# Patient Record
Sex: FEMALE | Race: WHITE | NOT HISPANIC OR LATINO | Employment: FULL TIME | URBAN - METROPOLITAN AREA
[De-identification: names, ages, dates, MRNs, and addresses within clinical notes are randomized per-mention and may not be internally consistent; named-entity substitution may affect disease eponyms.]

---

## 2024-02-05 ENCOUNTER — PRE VISIT (OUTPATIENT)
Dept: ONCOLOGY | Facility: CLINIC | Age: 49
End: 2024-02-05
Payer: COMMERCIAL

## 2024-02-05 ENCOUNTER — PATIENT OUTREACH (OUTPATIENT)
Dept: ONCOLOGY | Facility: CLINIC | Age: 49
End: 2024-02-05
Payer: COMMERCIAL

## 2024-02-05 ENCOUNTER — TRANSCRIBE ORDERS (OUTPATIENT)
Dept: OTHER | Age: 49
End: 2024-02-05

## 2024-02-05 DIAGNOSIS — C57.4 PRIMARY SEROUS CARCINOMA OF UTERINE ADNEXA (H): Primary | ICD-10-CM

## 2024-02-05 DIAGNOSIS — C56.9 OVARIAN CANCER, UNSPECIFIED LATERALITY (H): ICD-10-CM

## 2024-02-05 NOTE — PROGRESS NOTES
"New Patient Hematology / Oncology Nurse Navigator Note     Referral Date: 2/5/24    Referring provider: Patient called. Referred by her team at   Cathy Huston MD   [address] [place] [address]   [phone] (Work)   [phone] (Fax)     Referring Clinic/Organization: [facility]   Self Referred     Referred to: GynOnc    Requested provider (if applicable): Per referral notes, Milla Beckman, Ml or Elayne were recommended by pt's team at     Evaluation for : \"ovarian cancer\" per pt report. Path not yet viewable in CE     Clinical History (per Nurse review of records provided):    1/24/24 Op Note: Total laparoscopic hysterectomy, bilateral salpingectomy, cystoscopy  -- BOOKMARKED  1/31/24 Office Visit with OBGYN at -- BOOKMARKED  2/2  (11) -- BOOKMARKED   10/3/23 Pelvic US--BOOKMARKED  8/14/23 PAP/HPV--BOOKMARKED    Clinical Assessment / Barriers to Care (Per Nurse):  Pt lives in [place], [place]      Records Location: Care Everywhere     Records Needed:   Need 1/24 path report from [facility], records from [facility]/ per protocol     Additional testing needed prior to consult:   N/A    Referral updates and Plan:   Introduced my role as nurse navigator with [facility] and that we have recd the self-referral to GynOnc, but are still awaiting complete records (need path report confirming dx).  Explained to pt that he/she will receive a call from our scheduling intake team and the records team will work to obtain the records.     Provided my direct contact number if any further questions in the meantime.     Nallely Rodrigues, BSN, RN, PHN, OCN  Hematology/Oncology Nurse Navigator  [facility]  [phone]    "

## 2024-02-05 NOTE — TELEPHONE ENCOUNTER
"Action Taken  Date/Description  TJ     WT from NPS February 5, 2024 9:01 AM    Call from Pt to schedule for Dx of Ovarian cancer, unspecified laterality (H) [C56.9]   Test result notes state Pt referred by Dr Cathy Huston   Pt Dx'd @ [facility]  Records updated in Care Everywhere?  Yes - [facility] and [facility]  Prior history of Hematologist, Oncologist? No  Genetic Testing conducted?  No - Referral and consult scheduled for 4.25.24 @ [facility]  Surgical procedures, biopsies?  1.24.24 @ [facility]-  Case: [id number]   Report in CE  Imaging done @: [employer] - 2 Pelvic US's   Has Pt been anywhere else for this condition/concern?  No  Any scheduled follow up's/imaging/surgical procedures/biopsies?  Post Op 3.19.24    Pt had the US's last year followed the Hysterectomy and the cancer was found through the surgical path at that time.    February 29, 2024  IB from provider that path was never received.  Checked tracking and it never shipped.  Re-faxed as 2nd request     Records Requested  TJ   Clinic name Comments/Action Taken   [facility] February 5, 2024 9:33 AM    Called and requested US's be pushed to PACS  10:14 AM    Images from [facility] received and resolved to PACS.     NOTES STATUS DETAILS   OFFICE NOTE from referring provider  Self Referred   LABS     PATHOLOGY REPORTS Requested   TRK: [id number] [facility] Case: [id number]    IMAGING (NEED IMAGES & REPORT)     ULTRASOUND Resolved in PACS [facility]     "

## 2024-02-06 NOTE — PROGRESS NOTES
Addendum 2/6:   Path now viewable in CE showing:  Final Diagnosis A) UTERUS WITH CERVIX AND FALLOPIAN TUBES, TOTAL HYSTERECTOMY WITH BILATERAL SALPINGECTOMY:  1. 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 Left fallopian tube: Benign paratubal cysts, otherwise unremarkable  3. Cervix: Benign endocervical polyp  4. Endometrium: Benign endometrial polyp, background proliferative endometrium  5. Myometrium: Leiomyomas and adenomyosis  6. Uterine serosa: Endometriosis  7. Uterine weight: 237 grams     Scheduling instructions updated and routed to NPS requesting consult with GynOnc within 1-2 weeks pending patient provider/location preference. Will follow-up as needed.     Nallely Rodrigues, DEQUANN, RN, PHN, OCN  Hematology/Oncology Nurse Navigator  [facility]  [phone]

## 2024-02-11 NOTE — PROGRESS NOTES
Consult Notes on Referred Patient    Date: 2024       Dr. Monique Miller MD  No address on file       RE: Jonelle Hart  : 1975  MIKAYLA: 2024    Dear Dr. Referred Self:    I had the pleasure of seeing your patient Jonelle Hart here at the Gynecologic Cancer Clinic at the [facility] on 2024.  As you know she is a very pleasant 48 year old woman with a recent diagnosis of  STIC arising Right FT fimbria.  Given these findings she was subsequently sent to the Gynecologic Cancer Clinic for new patient consultation.     HPI    47 y.o.  presents for above. Jonelle has been previously seen by other providers in [place] and has felt her symptoms have been dismissed. She reports periods are irregular q22-40 days lasting 6-14 days with first few days heavy with clots and very intense cramping unable to function. Under went surgical removal of uterus.    24:   A) UTERUS WITH CERVIX AND FALLOPIAN TUBES, TOTAL HYSTERECTOMY WITH BILATERAL SALPINGECTOMY:   1. 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 Left fallopian tube: Benign paratubal cysts, otherwise unremarkable   3. Cervix: Benign endocervical polyp   4. Endometrium: Benign endometrial polyp, background proliferative endometrium   5. Myometrium: Leiomyomas and adenomyosis   6. Uterine serosa: Endometriosis   7. Uterine weight: 237 grams    Ca 125=11      Review of Systems:    I have reviewed the pertinent positive findings in the review of symptoms with the patient.        Past Medical History:    No heart dz. Lung or DM      Past Surgical History:  Laoparoscopic hyst         Health Maintenance:  Health Maintenance Due   Topic Date Due    ADVANCE CARE PLANNING  Never done    GLUCOSE  Never done    HEPATITIS B IMMUNIZATION (1 of 3 - 3-dose series) Never done    COLORECTAL CANCER SCREENING  Never done    HIV SCREENING  Never done     "HEPATITIS C SCREENING  Never done    PAP  Never done    DTAP/TDAP/TD IMMUNIZATION (1 - Tdap) Never done    LIPID  Never done    YEARLY PREVENTIVE VISIT  2023    PHQ-2 (once per calendar year)  Never done       Last Pap Smear: 2023              Result: normal  She has not had a history of abnormal Pap smears.    Last Mammogram: 23             Result: normal      She has not had a history of abnormal mammograms.    Last Colonoscopy: 1 year ago, 2 polyps removed              Result: abnormal: polyps, next due in 5 years      Current Medications:     has a current medication list which includes the following prescription(s): acetaminophen, ibuprofen, and simethicone.       Allergies:     erythromycin       Social History:     Social History     Tobacco Use    Smoking status: Never    Smokeless tobacco: Never   Substance Use Topics    Alcohol use: Never       History   Drug Use Not on file     No tobacco x 10 years      Family History:     The patient's family history is notable for the following.    Paternal GF  colon cancer in his 50's and paternal cousin age 53, abnormal polyps, no uterine cancer or ovarian cancer  No breast cancer.       Physical Exam:     /77   Pulse 70   Temp 98.1  F (36.7  C)   Resp 15   Ht 1.626 m (5' 4\")   Wt 85.7 kg (189 lb)   SpO2 99%   BMI 32.44 kg/m    Body mass index is 32.44 kg/m .    General Appearance: healthy and alert, no distress      Musculoskeletal: extremities non tender and without edema        Neurological: normal gait, no gross defects     Psychiatric: appropriate mood and affect                               Hematological: normal cervical, supraclavicular and inguinal lymph nodes     Gastrointestinal:       abdomen soft, non-tender, non-distended, no organomegaly or masses    Genitourinary: External genitalia and urethral meatus appears normal.  Vagina is smooth without nodularity or masses.  Cervix appears normal and without lesions.  Bimanual " exam reveal no masses, nodularity or fullness.  Recto-vaginal exam confirms these findings.      Assessment:    Jonelle Hart is a 48 year old woman with a new diagnosis of STIC lesion on FT         Plan:     1.)    STIC lesions are precursor lesions for high-grade serous carcinoma but have the potential for metastatic spread. There is no clear evidence that surgical staging and/or adjuvant therapy is beneficial in patients with STIC lesions alone. Referral to a genetic counselor is recommended because these patients may have a 10 percent chance of carrying a BRCA1 or BRCA2 mutation.  Lengthy discussion held today re lack of true recommendations regarding next steps in this scenario. Recommend GT first and then plan for possible oophorectomy followed by ERT. Patient and  understanding of this and the situation. Will think about next plans however will consent to laparoscopic washings and biopsies at this point.   -Recommend q 6 mos  and pelvic exams.  -Path reread of STIC lesion  -GC appt 4/25/24 earliest appt at .   -Wait 6 weeks for laparoscopic omentectomy, washings, peritoneal biopsies, possible oophorectomy.       2.) Genetic risk factors were assessed and the patient doest meet the qualifications for a referral.  GC referral. Sent note to Peg Beltran re need to get patient in sooner than later.     3.) Labs and/or tests ordered include:  none today.     4.) Health maintenance issues addressed today include UTD.    5.) Pre-op teaching was completed today.  Risks of surgery were discussed to include: bleeding, transfusion, infection, unintentional injury to surrounding organs/structures.      Thank you for allowing us to participate in the care of your patient.         Sincerely,    Mimi Loyola MD  Professor  Department of Ob/Gyn and Women's Health  Division of Gynecologic Oncology  [facility]  [phone]  CC  Patient Care Team:  Maribeth Yi NP as  PCP - General  SELF, REFERRED

## 2024-02-12 ENCOUNTER — PATIENT OUTREACH (OUTPATIENT)
Dept: ONCOLOGY | Facility: CLINIC | Age: 49
End: 2024-02-12

## 2024-02-12 ENCOUNTER — ONCOLOGY VISIT (OUTPATIENT)
Dept: ONCOLOGY | Facility: CLINIC | Age: 49
End: 2024-02-12
Attending: OBSTETRICS & GYNECOLOGY
Payer: COMMERCIAL

## 2024-02-12 ENCOUNTER — PATIENT OUTREACH (OUTPATIENT)
Dept: ONCOLOGY | Facility: CLINIC | Age: 49
End: 2024-02-12
Payer: COMMERCIAL

## 2024-02-12 VITALS
HEART RATE: 70 BPM | TEMPERATURE: 98.1 F | WEIGHT: 189 LBS | SYSTOLIC BLOOD PRESSURE: 116 MMHG | OXYGEN SATURATION: 99 % | DIASTOLIC BLOOD PRESSURE: 77 MMHG | BODY MASS INDEX: 32.27 KG/M2 | RESPIRATION RATE: 15 BRPM | HEIGHT: 64 IN

## 2024-02-12 DIAGNOSIS — C57.01 MALIGNANT NEOPLASM OF FALLOPIAN TUBE, RIGHT (H): Primary | ICD-10-CM

## 2024-02-12 PROCEDURE — 99213 OFFICE O/P EST LOW 20 MIN: CPT | Performed by: OBSTETRICS & GYNECOLOGY

## 2024-02-12 PROCEDURE — 99203 OFFICE O/P NEW LOW 30 MIN: CPT | Performed by: OBSTETRICS & GYNECOLOGY

## 2024-02-12 RX ORDER — SIMETHICONE 80 MG
80 TABLET,CHEWABLE ORAL EVERY 6 HOURS PRN
COMMUNITY
Start: 2024-01-24 | End: 2024-02-18

## 2024-02-12 RX ORDER — HEPARIN SODIUM 5000 [USP'U]/.5ML
5000 INJECTION, SOLUTION INTRAVENOUS; SUBCUTANEOUS
Status: CANCELLED | OUTPATIENT
Start: 2024-02-12

## 2024-02-12 RX ORDER — IBUPROFEN 200 MG
600 TABLET ORAL EVERY 4 HOURS PRN
COMMUNITY
Start: 2024-01-03

## 2024-02-12 RX ORDER — ACETAMINOPHEN 500 MG
500-1000 TABLET ORAL EVERY 4 HOURS PRN
COMMUNITY
Start: 2024-01-03

## 2024-02-12 ASSESSMENT — PAIN SCALES - GENERAL: PAINLEVEL: NO PAIN (0)

## 2024-02-12 NOTE — PROGRESS NOTES
Writer received Cancer Risk Management Program referral, referred for:    Has STIC lesion, may need staging is she BRCA positive?      Reviewed for appropriate plan, and sent to New Patient Scheduling for completion.

## 2024-02-12 NOTE — LETTER
2024         RE: Jonelle Hart  [address]  [place] [address]        Dear Colleague,    Thank you for referring your patient, Jonelle Hart, to the [facility]. Please see a copy of my visit note below.                            Consult Notes on Referred Patient    Date: 2024       Dr. Monique Miller MD  No address on file       RE: Jonelle Hart  : 1975  MIKAYLA: 2024    Dear Dr. Referred Self:    I had the pleasure of seeing your patient Jonelle Hart here at the Gynecologic Cancer Clinic at the [facility] on 2024.  As you know she is a very pleasant 48 year old woman with a recent diagnosis of  STIC arising Right FT fimbria.  Given these findings she was subsequently sent to the Gynecologic Cancer Clinic for new patient consultation.     HPI    47 y.o.  presents for above. Jonelle has been previously seen by other providers in [place] and has felt her symptoms have been dismissed. She reports periods are irregular q22-40 days lasting 6-14 days with first few days heavy with clots and very intense cramping unable to function. Under went surgical removal of uterus.    24:   A) UTERUS WITH CERVIX AND FALLOPIAN TUBES, TOTAL HYSTERECTOMY WITH BILATERAL SALPINGECTOMY:   1. 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 Left fallopian tube: Benign paratubal cysts, otherwise unremarkable   3. Cervix: Benign endocervical polyp   4. Endometrium: Benign endometrial polyp, background proliferative endometrium   5. Myometrium: Leiomyomas and adenomyosis   6. Uterine serosa: Endometriosis   7. Uterine weight: 237 grams    Ca 125=11      Review of Systems:    I have reviewed the pertinent positive findings in the review of symptoms with the patient.        Past Medical History:    No heart dz. Lung or DM      Past Surgical History:  Laoparoscopic hyst    "      Health Maintenance:  Health Maintenance Due   Topic Date Due     ADVANCE CARE PLANNING  Never done     GLUCOSE  Never done     HEPATITIS B IMMUNIZATION (1 of 3 - 3-dose series) Never done     COLORECTAL CANCER SCREENING  Never done     HIV SCREENING  Never done     HEPATITIS C SCREENING  Never done     PAP  Never done     DTAP/TDAP/TD IMMUNIZATION (1 - Tdap) Never done     LIPID  Never done     YEARLY PREVENTIVE VISIT  2023     PHQ-2 (once per calendar year)  Never done       Last Pap Smear: 2023              Result: normal  She has not had a history of abnormal Pap smears.    Last Mammogram: 23             Result: normal      She has not had a history of abnormal mammograms.    Last Colonoscopy: 1 year ago, 2 polyps removed              Result: abnormal: polyps, next due in 5 years      Current Medications:     has a current medication list which includes the following prescription(s): acetaminophen, ibuprofen, and simethicone.       Allergies:     erythromycin       Social History:     Social History     Tobacco Use     Smoking status: Never     Smokeless tobacco: Never   Substance Use Topics     Alcohol use: Never       History   Drug Use Not on file     No tobacco x 10 years      Family History:     The patient's family history is notable for the following.    Paternal GF  colon cancer in his 50's and paternal cousin age 53, abnormal polyps, no uterine cancer or ovarian cancer  No breast cancer.       Physical Exam:     /77   Pulse 70   Temp 98.1  F (36.7  C)   Resp 15   Ht 1.626 m (5' 4\")   Wt 85.7 kg (189 lb)   SpO2 99%   BMI 32.44 kg/m    Body mass index is 32.44 kg/m .    General Appearance: healthy and alert, no distress      Musculoskeletal: extremities non tender and without edema        Neurological: normal gait, no gross defects     Psychiatric: appropriate mood and affect                               Hematological: normal cervical, supraclavicular and inguinal " lymph nodes     Gastrointestinal:       abdomen soft, non-tender, non-distended, no organomegaly or masses    Genitourinary: External genitalia and urethral meatus appears normal.  Vagina is smooth without nodularity or masses.  Cervix appears normal and without lesions.  Bimanual exam reveal no masses, nodularity or fullness.  Recto-vaginal exam confirms these findings.      Assessment:    Jonelle Hart is a 48 year old woman with a new diagnosis of STIC lesion on FT         Plan:     1.)    STIC lesions are precursor lesions for high-grade serous carcinoma but have the potential for metastatic spread. There is no clear evidence that surgical staging and/or adjuvant therapy is beneficial in patients with STIC lesions alone. Referral to a genetic counselor is recommended because these patients may have a 10 percent chance of carrying a BRCA1 or BRCA2 mutation.  Lengthy discussion held today re lack of true recommendations regarding next steps in this scenario. Recommend GT first and then plan for possible oophorectomy followed by ERT. Patient and  understanding of this and the situation. Will think about next plans however will consent to laparoscopic washings and biopsies at this point.   -Recommend q 6 mos  and pelvic exams.  -Path reread of STIC lesion  -GC appt 4/25/24 earliest appt at .   -Wait 6 weeks for laparoscopic omentectomy, washings, peritoneal biopsies, possible oophorectomy.       2.) Genetic risk factors were assessed and the patient doest meet the qualifications for a referral.  GC referral. Sent note to Peg Beltran re need to get patient in sooner than later.     3.) Labs and/or tests ordered include:  none today.     4.) Health maintenance issues addressed today include UTD.    5.) Pre-op teaching was completed today.  Risks of surgery were discussed to include: bleeding, transfusion, infection, unintentional injury to surrounding organs/structures.      Thank you for allowing  us to participate in the care of your patient.         Sincerely,    Mimi Loyola MD  Professor  Department of Ob/Gyn and Women's Health  Division of Gynecologic Oncology  [facility]  [phone]  CC  Patient Care Team:  Maribeth Yi NP as PCP - General  SELF, REFERRED

## 2024-02-13 ENCOUNTER — TELEPHONE (OUTPATIENT)
Dept: ONCOLOGY | Facility: CLINIC | Age: 49
End: 2024-02-13
Payer: COMMERCIAL

## 2024-02-13 NOTE — TELEPHONE ENCOUNTER
Patient is scheduled for surgery with: Dr. Loyola    Surgery Date: 3/6     Location: [facility]    H&P: to be completed by Primary Care team - patient instructed to schedule per patient, this will be scheduled with [facility]     Post-op: will be scheduled by the clinic    Patient will receive a phone call from pre-admission nurses 1-2 days prior to surgery with arrival time and NPO instructions.    Patient aware times are subject to change up until day before surgery.     Patient questions/concerns:  Patient needs a letter from doctor to have more time off os work. She needs a call from nurse as soon as possible. Will have RNCC reach out to patient to discuss.      Surgery packet was sent via US mail 2/13      Jyoti Willard on 2/13/2024 at 10:14 AM

## 2024-02-14 ENCOUNTER — PATIENT OUTREACH (OUTPATIENT)
Dept: ONCOLOGY | Facility: CLINIC | Age: 49
End: 2024-02-14
Payer: COMMERCIAL

## 2024-02-14 NOTE — PROGRESS NOTES
[facility]: Cancer Care Initial Note                                    Discussion with Patient:                                                      surgery             Assessment:                                                      Initial  Current living arrangement:: I live in a private home  Informal Support system:: Family  Equipment Currently Used at Home: none  Bed or wheelchair confined:: No  Mobility Status: Independent  Transportation means:: Regular car  Referrals Placed: None    Plan of Care Education Review:   Assessment completed with:: Patient    Plan of Care Education   Diagnosis:: peritoneal carcinomatosis  Does patient understand diagnosis?: Yes  Tx plan/regimen:: surgery  Does patient understand treatment plan/regimen?: Yes  Plan of Care:: MD follow-up appointment  When to call provider:: Bleeding;Uncontrolled diarrhea/constipation;Increased shortness of breath;New/worsening pain;Shaking chills;Temperature >100.4F;Uncontrolled nausea/vomiting    Evaluation of Learning  Patient Education Provided: Yes  Readiness:: Eager  Method:: Booklet/Handout  Response:: Verbalizes understanding         Pre/Post Procedure:   Surgery/Procedure plan reviewed with patient  Anesthesia Type: general anesthesia  Relevant Diagnosis: peritoneal carcinomatoiss  Pre-Op Physical to be completed by (e.g.: PCP, Pre-Assessment Center, etc.):: Surgeon     Education  Person Taught: patient  Learning Readiness and Ability: no barriers identified  Pre-op Care Instructions: proper use of medications, when to take, and when to hold;diet;bowel management;sexual activity restriction;physical activity restriction;bladder management;preventing pneumonia/incentive spirometer use;when to call provider;pain management;nausea management;blood clot prevention;incision care/wound management  Pre-op Infection Prevention Reviewed: pre-op CHG bathing instructions;bathing care after procedure  Pre-op Planning Reviewed:   "arrangements, if indicated;how to get to procedure location;post-op support plan (\"who will help care for you after your surgery/procedure?\")  Pre-op Education/Instructions provided: how to prepare for surgery;what to expect on surgery day;surgery location specifics (map, parking, phone number)  Post-op Care Instructions: proper use of medications, when to take, and when to hold;pain management;preventing pneumonia/incentive spirometer use;nausea management;diet;bowel management;bladder management;home care/follow-up care;when to call provider;blood clot prevention;sexual activity restriction;physical activity restriction;travel restrictions;incision care/wound management  Education Outcome Evaluation: eager to learn            Pre-op Checklist Reviewed  Labs: n/a  Chest X-Ray: n/a  COVID-19 Testing: n/a  EKG: n/a  Urinalysis: n/a  Anticoagulation plan: n/a  Bowel Prep: n/a  Rehab: n/a  Other (see comment): n/a                                                                 Intervention/Education provided during outreach:                                                           Follow up call in 1-2 weeks    Signature:  Dominga Cotter RN  "

## 2024-02-14 NOTE — PATIENT INSTRUCTIONS
It was a pleasure seeing you in clinic today-please reach out if there are any further questions that arise following today's visit.  During business hours, you may reach me at [phone].  For urgent/emergent questions after business hours, you may reach the on-call Gynecologic Oncology Resident through the [facility]  at [phone].    All normal test results are usually communicated via letter or [employer] message.  Abnormal results (those that require a change in the previously discussed plan of care) are usually communicated via a phone call.    I recommend signing up for "[employer] access if you have not already done so.  This allows you online access to your lab results and also helps you communicate efficiently with your clinic should any questions arise in your care.        You will be/have been scheduled for surgery     Diagnosis:      endometrial cancer       The surgical procedure is: Laparoscopic omental biopsy, peritoneal washings, peritoneal biopsies, possible removal of bilateral ovaries - Bilateral     Anticipated length of surgery: .  You will be in the recovery room for approximately 2-4hrs after surgery.      You will be going home the same day/  At discharge you will need a someone to drive you home.  You will need someone to stay with you for the first 24 hours you are home.    Preparation for Surgery:    To Schedule             *  Schedule a preoperative visit with primary care doctor for     preoperative clearance for surgery             *  No solid foods or milk products 10 hours before your surgery time, you can  have clear liquids up until 4 hours before your surgery time.    Postoperative Restrictions:  No heavy lifting >20 lbs or strenuous              activity                        nothing in the vagina (no tampons, no intercourse, no douching) for eight weeks.     Postoperative plan  Decreased appetite is normal, plan to eat 6-8 small meal day, drink at least 6-8  glasses of water per day, there are no dietary restrictions.   Take stool softener daily as directed  for at least 1-2 weeks unless you develop diarrhea.  Activity as tolerated, reminder to balance rest with activity as you will tire easily while recovering. Using stairs is ok. Walk as much as tolerated, increasing your activity every day.   You may shower the day after surgery, your incision site can get wet/soapy, pat dry.    You will be given ibuprofen and tylenol, take on schedule every 6 hours.  Do not set an alarm to wake yourself up to take the pain medications.  Take consistently for a week  then you can decrease/stop as tolerated.  You will also be given a small dose of narcotics, you may take this in addition to the tylenol/ibuprofen as needed if the pain is not manageable.  do not take this on a schedule.    Ok to resume driving a week or so after surgery after discontinue of narcotics  Wound care:  you may cover your incision sites if there is drainage or clothing is causing irritation otherwise leave open to the air.  No need to put an creams/ointments on incision site.  Wound will be mildly pink and might have a firm ridge underneath this is normal and will resolve in 4-6 weeks      Call clinic if you have fever greater than 100.5, heavy vaginal bleeding, persistent nausea/vomiting, increasing redness, pain, swelling at incision site, drainage with bad odor or other concerns.      You should be feeling better every day.    Postoperative visit:  Return to clinic 1-2 weeks after surgery for post operative visit.  Will discuss your pathology results and any needed follow up plan at that visit.      Please contact the clinic with any questions or concerns.      Dr Milla Cruz RN  Phone:  [phone]  Fax:  [phone]

## 2024-02-16 NOTE — PROGRESS NOTES
[address]  [address]  [id number]  Saw md Monday  Need fmla papers for surgery  Where to drop off  Need letter medical neccessity for leave of absence  Currently on leave due to last surgery

## 2024-02-16 NOTE — PROGRESS NOTES
Spoke with pt  Reviewed options to get paper work here  Gave fax, email, my chart, or drop off options  Pt will drop by paperwork and asked to be contacted when papers are ready    Will look for papers and get filled out

## 2024-02-19 ENCOUNTER — VIRTUAL VISIT (OUTPATIENT)
Dept: ONCOLOGY | Facility: CLINIC | Age: 49
End: 2024-02-19
Attending: OBSTETRICS & GYNECOLOGY
Payer: COMMERCIAL

## 2024-02-19 DIAGNOSIS — Z80.0 FAMILY HISTORY OF COLON CANCER: ICD-10-CM

## 2024-02-19 DIAGNOSIS — C57.01 MALIGNANT NEOPLASM OF FALLOPIAN TUBE, RIGHT (H): ICD-10-CM

## 2024-02-19 DIAGNOSIS — D49.59: Primary | ICD-10-CM

## 2024-02-19 PROCEDURE — 96040 HC GENETIC COUNSELING, EACH 30 MINUTES: CPT | Mod: GT,95 | Performed by: GENETIC COUNSELOR, MS

## 2024-02-19 NOTE — LETTER
2/19/2024         RE: Jonelle Hart  [address]  [facility] [address]        Dear Colleague,    Thank you for referring your patient, Jonelle Hart, to the [facility]. Please see a copy of my visit note below.    2/19/2024    Virtual Visit Details  Type of service:  Video Visit   Originating Location (pt. Location): Home  Distant Location (provider location):  Off-site  Platform used for Video Visit: [employer]  Length of video visit: 47 minutes (45 in consult)    Referring Provider: Mimi Loyola MD    Presenting Information:   Today Jonelle elected for a virtual genetic counseling visit through the Cancer Risk Management Program to discuss her personal history of a STIC lesion and family history of cancer. We reviewed this history, cancer screening recommendations, and available genetic testing options.    Personal History:  Jonelle is a 48 year old female. She does not have any personal history of cancer. She recently pursued surgery to remove her uterus and both fallopian tubes to address bleeding and fibroids. 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 Based on this history, Jonelle met with Dr. Loyola and surgery to remove both ovaries is scheduled on 3/6/2024.    She had her first menstrual period at age 13, her first child at age 21, and is currently premenopausal. She had a CA-125 level drawn earlier in February 2024 that was normal (11). She reports that she has never used hormone replacement therapy.      She has regular mammograms and her most recent mammogram in June 2023 was normal. Her most recent colonoscopy in June 2022 removed two tubular adenomas (3-6mm in size) and follow-up was recommended in five years. She does not regularly do any other cancer screening at this time.    Family History: (Please see scanned pedigree for detailed family history information)  Jonelle's maternal half  brother is 51 and was found to have a small brain mass in his 50's; no biopsy has been performed and he is currently under observation alone.  One maternal uncle was diagnosed with lung cancer and passed away in his 50's; he had a history of smoking.  Jonelle's father passed away at age 61 from a traumatic brain injury; he may have also had prostate cancer, but additional details are uncertain.  One paternal uncle was diagnosed with non-Hodgkin's lymphoma and passed away in his 70's.  His daughter was diagnosed with colon cancer in her 40's and passed away in her 50's.  One paternal aunt is in her 70's and has not had a cancer.  Her daughter is in her 50's and may have been diagnosed with an unknown cancer in her 40/50's.  Jonelle's paternal grandfather was diagnosed with colon cancer at an unknown age and passed away in his 50's.  Her maternal ethnicity is [place]io[place] and [place]i. Her paternal ethnicity is [place], Togolese, Citizen of [place], and . There is no known Ashkenazi Hindu ancestry on either side of her family.    Discussion:  We reviewed the features of sporadic, familial, and hereditary cancers. In looking at Jonelle's paternal family history, it is possible that a cancer susceptibility gene is present as several of her paternal relatives have been diagnosed with potentially related cancers (prostate, colon) in three generations; several of these relatives were also diagnosed under age 50. We also reviewed that though the STIC lesion is not a cancer itself, studies have shown that approximately 10-12% of individuals with a BRCA mutation are found to have a STIC lesion in their fallopian tube(s).  We discussed the natural history and genetics of hereditary fallopian tube/ovarian cancer, including hereditary breast and ovarian cancer (HBOC) syndrome and Padilla syndrome.   We reviewed that the most common cause of hereditary fallopian tube/ovarian cancer is HBOC syndrome, which is caused by mutations in the  BRCA1 and BRCA2 genes. Individuals with HBOC syndrome are at increased risk for several different cancers, including breast, ovarian, male breast, prostate, melanoma, and pancreatic cancer.  We also reviewed Padilla syndrome, which can be caused by a mutation in one of five genes: MLH1, MSH2, MSH6, PMS2, and EPCAM. The highest cancer risks associated with Padilla syndrome include colon, endometrial/uterine, gastric, and ovarian cancer. Other cancers have also been reported with Padilla syndrome, including prostate cancer.   We discussed that there are additional genes that could cause increased risk for the cancers in her family. As many of these genes present with overlapping features in a family and accurate cancer risk cannot always be established based upon the pedigree analysis alone, it would be reasonable for Jonelle to consider panel genetic testing to analyze multiple genes at once.  Based on her personal history of a STIC lesion, Jonelle meets current National Comprehensive Cancer Network (specifically the Ovarian Cancer/Fallopian Tube Cancer/Primary Peritoneal Cancer treatment guideline) criteria for a genetics evaluation.  A detailed handout regarding these genes/syndromes and the information we discussed was provided to Jonelle at the end of our appointment today and can be found in the after visit summary. Topics included: inheritance pattern, cancer risks, cancer screening recommendations, and also risks, benefits and limitations of testing.  We reviewed genetic testing options for hereditary gynecologic, colon, and related cancers: [facility] Common Hereditary Cancers Panel and expanded [facility] Multi-Cancer Panel. She opted for the [facility] Common Hereditary Cancers Panel.  Genetic testing is available for 48 genes associated with cancers of the breast, ovary, uterus, prostate and gastrointestinal system: [facility] Common Hereditary Cancers panel (APC, JOSE, AXIN2, BAP1, BARD1, BMPR1A, BRCA1, BRCA2, BRIP1, CDH1, CDK4,  CDKN2A, CHEK2, CTNNA1, DICER1, EPCAM, FH, GREM1, HOXB13, KIT, MBD4, MEN1, MLH1, MSH2, MSH3, MSH6, MUTYH, NF1, NTHL1, PALB2, PDGFRA, PMS2, POLD1, POLE, PTEN, RAD51C, RAD51D, SDHA, SDHB, SDHC, SDHD, SMAD4, SMARCA4, STK11, TP53, TSC1, TSC2, VHL).  Consent was obtained over the video and Jonelle elected to schedule a lab appointment at her earliest convenience. Once her blood is drawn, genetic testing using the Common Hereditary Cancers Panel will be sent to [facility]. Of note, testing will begin with the BRCA1 and BRCA2 genes, with reflex to the complete panel. Turn around time: 2-3 weeks after [facility] receives her blood sample.  Medical Management: For Jonelle, we reviewed that the information from genetic testing may determine:  additional cancer screening for which Jonelle may qualify (i.e. mammogram and breast MRI, more frequent colonoscopies, more frequent dermatologic exams, etc.),  options for risk reducing surgeries Jonelle could consider (i.e. bilateral mastectomy, etc.),    and targeted chemotherapies if she were to develop certain cancers in the future (i.e. immunotherapy for individuals with Padilla syndrome, PARP inhibitors, etc.).   These recommendations and possible targeted chemotherapies will be discussed in detail once genetic testing is completed.     Plan:  1) Today Jonelle elected to proceed with testing of the BRCA1 and BRCA2 genes, with reflex to the [facility] Common Hereditary Cancers Panel, through [facility]. She will schedule a lab appointment at her earliest convenience.  2) The results should be available 2-3 weeks after [facility] receives her blood sample.  3) Jonelle will be contacted to schedule a virtual return visit with me to discuss the results.    Peg Skinner MS, [facility]  Licensed, Certified Genetic Counselor  Office: [phone]  Pager: [phone]

## 2024-02-19 NOTE — NURSING NOTE
Is the patient currently in the state of [place]? YES    Visit mode:VIDEO    If the visit is dropped, the patient can be reconnected by: VIDEO VISIT: Text to cell phone:   Telephone Information:   Mobile [phone]       Will anyone else be joining the visit? NO  (If patient encounters technical issues they should call [phone]    How would you like to obtain your AVS? MyChart    Are changes needed to the allergy or medication list? No    Reason for visit: No chief complaint on file.    Alesha OLVERAF

## 2024-02-19 NOTE — PROGRESS NOTES
2/19/2024    Virtual Visit Details  Type of service:  Video Visit   Originating Location (pt. Location): Home  Distant Location (provider location):  Off-site  Platform used for Video Visit: Benja  Length of video visit: 47 minutes (45 in consult)    Referring Provider: Mimi Loyola MD    Presenting Information:   Today Jonelle elected for a virtual genetic counseling visit through the Cancer Risk Management Program to discuss her personal history of a STIC lesion and family history of cancer. We reviewed this history, cancer screening recommendations, and available genetic testing options.    Personal History:  Jonelle is a 48 year old female. She does not have any personal history of cancer. She recently pursued surgery to remove her uterus and both fallopian tubes to address bleeding and fibroids. 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 Based on this history, Jonelle met with Dr. Loyola and surgery to remove both ovaries is scheduled on 3/6/2024.    She had her first menstrual period at age 13, her first child at age 21, and is currently premenopausal. She had a CA-125 level drawn earlier in February 2024 that was normal (11). She reports that she has never used hormone replacement therapy.      She has regular mammograms and her most recent mammogram in June 2023 was normal. Her most recent colonoscopy in June 2022 removed two tubular adenomas (3-6mm in size) and follow-up was recommended in five years. She does not regularly do any other cancer screening at this time.    Family History: (Please see scanned pedigree for detailed family history information)  Jonelle's maternal half brother is 51 and was found to have a small brain mass in his 50's; no biopsy has been performed and he is currently under observation alone.  One maternal uncle was diagnosed with lung cancer and passed away in his 50's; he had a history of smoking.  Jonelle's  father passed away at age 61 from a traumatic brain injury; he may have also had prostate cancer, but additional details are uncertain.  One paternal uncle was diagnosed with non-Hodgkin's lymphoma and passed away in his 70's.  His daughter was diagnosed with colon cancer in her 40's and passed away in her 50's.  One paternal aunt is in her 70's and has not had a cancer.  Her daughter is in her 50's and may have been diagnosed with an unknown cancer in her 40/50's.  Jonelle's paternal grandfather was diagnosed with colon cancer at an unknown age and passed away in his 50's.  Her maternal ethnicity is Swedish and Citizen of [place]. Her paternal ethnicity is Persian, Citizen of [place], [place]ian, and . There is no known Ashkenazi Congregational ancestry on either side of her family.    Discussion:  We reviewed the features of sporadic, familial, and hereditary cancers. In looking at Jonelle's paternal family history, it is possible that a cancer susceptibility gene is present as several of her paternal relatives have been diagnosed with potentially related cancers (prostate, colon) in three generations; several of these relatives were also diagnosed under age 50. We also reviewed that though the STIC lesion is not a cancer itself, studies have shown that approximately 10-12% of individuals with a BRCA mutation are found to have a STIC lesion in their fallopian tube(s).  We discussed the natural history and genetics of hereditary fallopian tube/ovarian cancer, including hereditary breast and ovarian cancer (HBOC) syndrome and Padilla syndrome.   We reviewed that the most common cause of hereditary fallopian tube/ovarian cancer is HBOC syndrome, which is caused by mutations in the BRCA1 and BRCA2 genes. Individuals with HBOC syndrome are at increased risk for several different cancers, including breast, ovarian, male breast, prostate, melanoma, and pancreatic cancer.  We also reviewed Padilla syndrome, which can be caused by a mutation in one  of five genes: MLH1, MSH2, MSH6, PMS2, and EPCAM. The highest cancer risks associated with Padilla syndrome include colon, endometrial/uterine, gastric, and ovarian cancer. Other cancers have also been reported with Padilla syndrome, including prostate cancer.   We discussed that there are additional genes that could cause increased risk for the cancers in her family. As many of these genes present with overlapping features in a family and accurate cancer risk cannot always be established based upon the pedigree analysis alone, it would be reasonable for Jonelle to consider panel genetic testing to analyze multiple genes at once.  Based on her personal history of a STIC lesion, Jonelle meets current National Comprehensive Cancer Network (specifically the Ovarian Cancer/Fallopian Tube Cancer/Primary Peritoneal Cancer treatment guideline) criteria for a genetics evaluation.  A detailed handout regarding these genes/syndromes and the information we discussed was provided to Jonelle at the end of our appointment today and can be found in the after visit summary. Topics included: inheritance pattern, cancer risks, cancer screening recommendations, and also risks, benefits and limitations of testing.  We reviewed genetic testing options for hereditary gynecologic, colon, and related cancers: [facility] Common Hereditary Cancers Panel and expanded [facility] Multi-Cancer Panel. She opted for the [facility] Common Hereditary Cancers Panel.  Genetic testing is available for 48 genes associated with cancers of the breast, ovary, uterus, prostate and gastrointestinal system: [facility] Common Hereditary Cancers panel (APC, JOSE, AXIN2, BAP1, BARD1, BMPR1A, BRCA1, BRCA2, BRIP1, CDH1, CDK4, CDKN2A, CHEK2, CTNNA1, DICER1, EPCAM, FH, GREM1, HOXB13, KIT, MBD4, MEN1, MLH1, MSH2, MSH3, MSH6, MUTYH, NF1, NTHL1, PALB2, PDGFRA, PMS2, POLD1, POLE, PTEN, RAD51C, RAD51D, SDHA, SDHB, SDHC, SDHD, SMAD4, SMARCA4, STK11, TP53, TSC1, TSC2, VHL).  Consent was obtained  over the video and Jonelle elected to schedule a lab appointment at her earliest convenience. Once her blood is drawn, genetic testing using the Common Hereditary Cancers Panel will be sent to [facility]. Of note, testing will begin with the BRCA1 and BRCA2 genes, with reflex to the complete panel. Turn around time: 2-3 weeks after Raúl receives her blood sample.  Medical Management: For Jonelle, we reviewed that the information from genetic testing may determine:  additional cancer screening for which Jonelle may qualify (i.e. mammogram and breast MRI, more frequent colonoscopies, more frequent dermatologic exams, etc.),  options for risk reducing surgeries Jonelle could consider (i.e. bilateral mastectomy, etc.),    and targeted chemotherapies if she were to develop certain cancers in the future (i.e. immunotherapy for individuals with Padilla syndrome, PARP inhibitors, etc.).   These recommendations and possible targeted chemotherapies will be discussed in detail once genetic testing is completed.     Plan:  1) Today Jonelle elected to proceed with testing of the BRCA1 and BRCA2 genes, with reflex to the [facility] Common Hereditary Cancers Panel, through [facility]. She will schedule a lab appointment at her earliest convenience.  2) The results should be available 2-3 weeks after Raúl receives her blood sample.  3) Jonelle will be contacted to schedule a virtual return visit with me to discuss the results.    Peg Skinner MS, [facility]  Licensed, Certified Genetic Counselor  Office: [phone]  Pager: [phone]

## 2024-02-20 ENCOUNTER — LAB (OUTPATIENT)
Dept: LAB | Facility: CLINIC | Age: 49
End: 2024-02-20
Payer: COMMERCIAL

## 2024-02-20 DIAGNOSIS — Z80.0 FAMILY HISTORY OF COLON CANCER: ICD-10-CM

## 2024-02-20 DIAGNOSIS — D49.59: ICD-10-CM

## 2024-02-20 PROCEDURE — 99000 SPECIMEN HANDLING OFFICE-LAB: CPT

## 2024-02-20 PROCEDURE — 36415 COLL VENOUS BLD VENIPUNCTURE: CPT

## 2024-02-21 NOTE — PATIENT INSTRUCTIONS
Assessing Cancer Risk  Cancer is a common diagnosis which impacts many families.  Individuals may develop cancer due to environmental factors (such as exposures and lifestyle), aging, genetic predisposition, or a combination of these factors.      Only about 5-10% of cancers are thought to be due to an inherited cancer susceptibility gene.    These families often have:  Several people with the same or related types of cancer  Cancers diagnosed at a young age (before age 50)  Individuals with more than one primary cancer  Multiple generations of the family affected with cancer    Comprehensive Breast and Gynecologic Cancer Panel  We each inherit two copies of every gene in our bodies: one from our mother, and one from our father. Each gene has a specific job to do.  When a gene has a mistake or  mutation  in it, it does not work like it should.     Some people may be candidates for genetic testing of more than one gene.  For these families, genetic testing using a cancer panel may be offered. These panels will test different genes at once known to increase the risk for breast, ovarian, uterine, and/or other cancers.    This handout will review common hereditary breast and gynecologic cancer syndromes. The genes that will be discussed in this handout are: JOSE, BRCA1, BRCA2, BRIP1, CDH1, CHEK2, MLH1, MSH2, MSH6, PMS2, EPCAM, PTEN, PALB2, RAD51C, RAD51D, and TP53.    The purpose of this handout is to serve as a brief summary of the breast and gynecologic cancer risk genes that have published clinical management guidelines for individuals who are found to carry a mutation. Inheriting a mutation does not mean a person will develop cancer, but it does significantly increase their risk above the general population risk.     ______________________________________________________________________________    Hereditary Breast and Ovarian Cancer Syndrome (BRCA1 and BRCA2)  A single mutation in one of the copies of BRCA1 or  BRCA2 increases the risk for breast and ovarian cancer, among others.  The risk for pancreatic cancer and melanoma may also be slightly increased in some families.  The chart below shows the chance that someone with a BRCA mutation would develop cancer in his or her lifetime1,2,3,4.       Lifetime Cancer Risks    General Population BRCA1  BRCA2   Breast  12% >60% >60%   Ovarian  1-2% 39-58% 13-29%   Prostate 12% 7-26% 19-61%   Male Breast 0.1% 0.2-1.2% 1.8-7.1%   Pancreas 1-2% Up to 5% 5-10%     A person s ethnic background is also important to consider, as individuals of Ashkenazi Buddhist ancestry have a higher chance of having a BRCA gene mutation.  There are three BRCA mutations that occur more frequently in this population.      Padilla Syndrome (MLH1, MSH2, MSH6, PMS2, and EPCAM)  Currently five genes are known to cause Padilla Syndrome: MLH1, MSH2, MSH6, PMS2, and EPCAM.  A single mutation in one of the Padilla Syndrome genes increases the risk for colon, endometrial, ovarian, and stomach cancers.  Other cancers that occur less commonly in Padilla Syndrome include urinary tract, skin, and brain cancers.  The chart below shows the chance that a person with Padilla syndrome would develop cancer in his or her lifetime5.      Lifetime Cancer Risks    General Population Padilla Syndrome   Colon 5% 10-61%   Endometrial 3% 13-57%   Ovarian 1-2% 1-38%   Stomach <1% 1-9%   *Cancer risk varies depending on Padilla syndrome gene found      Cowden Syndrome (PTEN)  Cowden syndrome is a hereditary condition that increases the risk for breast, thyroid, endometrial, colon, and kidney cancer.  Cowden syndrome is caused by a mutation in the PTEN gene.  A single mutation in one of the copies of PTEN causes Cowden syndrome and increases cancer risk.  The chart below shows the chance that someone with a PTEN mutation would develop cancer in their lifetime6,7.  Other benign features seen in some individuals with Cowden syndrome include benign  skin lesions (facial papules, keratoses, lipomas), learning disability, autism, thyroid nodules, colon polyps, and larger head size.     Lifetime Cancer Risks    General Population Cowden   Breast 12% 40-60%*   Thyroid 1% Up to 38%   Renal 1-2% Up to 35%   Endometrial 3% Up to 28%   Colon 5% Up to 9%   Melanoma 2-3% Up to 6%   *Emerging data suggests the risk for breast cancer could be greater than 60%               Li-Fraumeni Syndrome (TP53)  Li-Fraumeni Syndrome (LFS) is a cancer predisposition syndrome caused by a mutation in the TP53 gene. A single mutation in one of the copies of TP53 increases the risk for multiple cancers. Individuals with LFS are at an increased risk for developing cancer at a young age. The lifetime risk for development of a LFS-associated cancer is 50% by age 30 and 90% by age 60.   Core Cancers: Sarcomas, Breast, Brain, Lung, Leukemias/Lymphomas, Adrenocortical carcinomas  Other Cancers: Gastrointestinal, Thyroid, Skin, Genitourinary       Hereditary Diffuse Gastric Cancer (CDH1)  Currently, one gene is known to cause hereditary diffuse gastric cancer (HDGC): CDH1.  Individuals with HDGC are at increased risk for diffuse gastric cancer and lobular breast cancer. Of people diagnosed with HDGC, 30-50% have a mutation in the CDH1 gene.  This suggests there are likely other genes that may cause HDGC that have not been identified yet.      Lifetime Cancer Risks    General Population HDGC   Diffuse Gastric  <1% ~80%   Breast 12% 41-60%       Additional Genes    JOSE  JOSE is a moderate-risk breast cancer gene. Women who have a mutation in JOSE can have between a 2-4 fold increased risk for breast cancer compared to the general population8. JOSE mutations have also been associated with increased risk for pancreatic cancer between 5-10%9. Individuals who inherit two JOSE mutations have a condition called ataxia-telangiectasia (AT).  This rare autosomal recessive condition affects the nervous system  and immune system, and is associated with progressive cerebellar ataxia beginning in childhood. Individuals with ataxia-telangiectasia often have a weakened immune system and have an increased risk for childhood cancers.    PALB2  Mutations in PALB2 have been shown to increase the risk of breast cancer up to 41-60% in some families; where individuals fall within this risk range is dependent upon family [id number]. PALB2 mutations have also been associated with increased risk for pancreatic cancer between 5-10%.  Individuals who inherit two PALB2 mutations--one from their mother and one from their father--have a condition called Fanconi Anemia.  This rare autosomal recessive condition is associated with short stature, developmental delay, bone marrow failure, and increased risk for childhood cancers.    CHEK2   CHEK2 is a moderate-risk breast cancer gene.  Women who have a mutation in CHEK2 have around a 2-4 fold increased risk for breast cancer compared to the general population, and this risk may be higher depending upon family history.11,12,13 The risk of colon cancer may be twice as high as the general population risk of colon cancer of 5%. Mutations in CHEK2 have also been shown to increase the risk of other cancers, including prostate, however these cancer risks are currently not well understood.    BRIP1, RAD51C and RAD51D  Mutations in RAD51C and RAD51D have been shown to increase the risk of ovarian cancer and breast cancer 14,. Mutations in BRIP1 have been shown to increase the risk of ovarian cancer and possibly female breast cancer 15 .       Lifetime Cancer Risk    General Population        BRIP1   RAD51C  RAD51D   Breast 12% Not well defined 20-40% 20-40%   Ovarian 1-2% 5-15% 10-15% 10-20%     ______________________________________________________________  Inheritance  All of the cancer syndromes reviewed above are inherited in an autosomal dominant pattern.  This means that if a parent has a mutation,  each of their children will have a 50% chance of inheriting that same mutation. Therefore, each child --male or female-- would have a 50% chance of being at increased risk for developing cancer.    Image obtained from Genetics Home Reference, 2013     Mutations in some genes can occur de christen, which means that a person s mutation occurred for the first time in them and was not inherited from a parent.  Now that they have the mutation, however, it can be passed on to future generations.    Genetic Testing  Genetic testing involves a blood test and will look for any harmful mutations that are associated with increased cancer risk.  If possible, it is recommended that the person(s) who has had cancer be tested before other family members.  That person will give us the most useful information about whether or not a specific gene is associated with the cancer in the family.    Results  There are three possible results of genetic testing:  Positive--a harmful mutation was identified in one or more of the genes  Negative--no mutations were identified in any of the genes tested  Variant of unknown significance--a variation in one of the genes was identified, but it is unclear how this impacts cancer risk in the family    Advantages and Disadvantages   There are advantages and disadvantages to genetic testing.    Advantages  May clarify your cancer risk  Can help you make medical decisions  May explain the cancers in your family  May give useful information to your family members (if you share your results)    Disadvantages  Possible negative emotional impact of learning about inherited cancer risk  Uncertainty in interpreting a negative test result in some situations  Possible genetic discrimination concerns (see below)    Genetic Information Nondiscrimination Act (LILIA)  The Genetic Information Nondiscrimination Act of 2008 (LILIA) is a federal law that protects individuals from health insurance or employment discrimination  based on a genetic test result alone (with some exceptions, including employers with fewer than 15 employees, and ).  Although rare, LILIA  does not cover discrimination protections in terms of life insurance, long term care, or disability insurances.  Visit the National Human [employer] [place] website to learn more.    Reducing Cancer Risk  All of the genes described in this handout have nationally recognized cancer screening guidelines that would be recommended for individuals who test positive.  In addition to increased cancer screening, surgeries may be offered or recommended to reduce cancer risk.  Recommendations are based upon an individual s genetic test result as well as their personal and family history of cancer.    Questions to Think About Regarding Genetic Testing:  What effect will the test result have on me and my relationship with my family members if I have an inherited gene mutation?  If I don t have a gene mutation?  Should I share my test results, and how will my family react to this news, which may also affect them?  Are my children ready to learn new information that may one day affect their own health?    Hereditary Cancer Resources    FORCE: Facing Our Risk of Cancer Empowered facingourrisk.org   Bright Pink bebrightpink.org   Li-Fraumeni Syndrome Association lfsassociation.org   PTEN World PTENworld.com   No stomach for cancer, Inc. nostomachforcancer.org   Stomach cancer relief network Scrnet.org   Collaborative Group of the Americas on Inherited Colorectal Cancer (CGA) cgaicc.com    Cancer Care cancercare.org   American Cancer Society (ACS) cancer.org   National Cancer [place] (NCI) cancer.gov     Please call us if you have any questions or concerns.   Cancer Risk Management Program [phone]-CANCER ([phone])  Mic Simpson, MS [facility] [place]  [phone]  Melody Thompson, MS, [facility] [place] [phone]  Lilia Rosado, MS, [facility] [place]  [phone]  Betsy Jacobson, MS, [facility] [place]  [phone]  Jen Penaloza,  MS, [facility]  [phone]  Peg Skinner, MS, [facility] [phone]  Grace Esqueda, MS, [facility] [phone]    References  Efren Lopez PDP, Tay S, Osvaldo FROST, Latonia JE, Flores JL, Louie N, Jackie H, Vini O, Shoaib A, Pasini B, Radipatty P, Mandat S, Selena DM, Irwin N, Carolina E, Elena H, Bridges E, Jamal J, Gronjosé miguel J, Bhavya B, Tulinius H, Thorlacius S, Eerola H, Nevanlinna H, Bran K, Theresa OP. Average risks of breast and ovarian cancer associated with BRCA1 or BRCA2 mutations detected in case series unselected for family history: a combined analysis of 222 studies. Am J Hum Mireya. 2003;72:1117-30.  Parish N, Kourtney M, Lalito G.  BRCA1 and BRCA2 Hereditary Breast and Ovarian Cancer. Gene Reviews online. 2013.  Zane YC, Daron S, Micah G, Haley S. Breast cancer risk among male BRCA1 and BRCA2 mutation carriers. J Natl Cancer Inst. 2007;99:1811-4.  Patrick CARLOS, Jony I, Kory J, Praful E, Aminata ER, Alie F. Risk of breast cancer in male BRCA2 carriers. J Med Mireya. 2010;47:710-1.  National Comprehensive Cancer Network. Clinical practice guidelines in oncology, colorectal cancer screening. Available online (registration required). 2015.  Sukhwinder MH, Lisa J, Danis J, Leah FONTANA, Charly MS, Eng C. Lifetime cancer risks in individuals with germline PTEN mutations. Clin Cancer Res. 2012;18:400-7.  Karl R. Cowden Syndrome: A Critical Review of the Clinical Literature. J Mireya . 2009:18:13-27.  Jennifer MCKEON, Rojas NAVARRO, Gautam S, Lilliana P, Willi T, Cuba M, Dirk B, Quin H, Karen R, Joyce K, Joselito L, Patrick CARLOS, Selena NAVARRO, Omar DF, Lissy MR, The Breast Cancer Susceptibility Collaboration ([place]) & Glo HAWKINS. JOSE mutations that cause ataxia-telangiectasia are breast cancer susceptibility alleles. Nature Genetics. 2006;38:873-875  Sami N , Lincoln Y, Betsey J, Yonis L, Juan PLUNKETT , Kwame ML, Jose S, Shana AG, Estella S, Patito ML, Richelle J , Juan F R, Darius ALEJANDRO, Bria  JR, Gary VE, Yara M, Voelliestein B, Laura N, Aruna RH, Magaly KW, and Jonathon AP. JOSE mutations in patients with hereditary pancreatic cancer. Cancer Discover. 2012;2:41-46  Shiv MA., et al. Breast-Cancer Risk in Families with Mutations in PALB2. NEJM. 2014; 371(6):497-506.  CHEK2 Breast Cancer Case-Control Consortium. CHEK2*1100delC and susceptibility to breast cancer: A collaborative analysis involving 10,860 breast cancer cases and 9,065 controls from 10 studies. Am J Hum Mireya, 74 (2004), pp. 5031-6154  Sue T, Kin S, Clara K, et al. Spectrum of Mutations in BRCA1, BRCA2, CHEK2, and TP53 in Families at High Risk of Breast Cancer. MATILDA. 2006;295(12):3159-7808.   Beni C, Nimo D, Tc MCKEON, et al. Risk of breast cancer in women with a CHEK2 mutation with and without a family history of breast cancer. J Clin Oncol. 2011;29:7888-1872.  Song H, Jaisons E, Ramus SJ, et al. Contribution of germline mutations in the RAD51B, RAD51C, and RAD51D genes to ovarian cancer in the population. J Clin Oncol. 2015;33(26):6506-3621. Doi:10.1200/JCO.2015.61.2408.  Anand T, Annie DF, Antoine P, et al. Mutations in BRIP1 confer high risk of ovarian cancer. Daphney Mireya. 2011;43(11):9290-2072. doi:10.1038/ng.955.

## 2024-02-26 ENCOUNTER — PATIENT OUTREACH (OUTPATIENT)
Dept: ONCOLOGY | Facility: CLINIC | Age: 49
End: 2024-02-26
Payer: COMMERCIAL

## 2024-02-27 DIAGNOSIS — Z80.0 FAMILY HISTORY OF COLON CANCER: ICD-10-CM

## 2024-02-27 DIAGNOSIS — D49.59: Primary | ICD-10-CM

## 2024-02-27 LAB — SCANNED LAB RESULT: NORMAL

## 2024-02-27 NOTE — PROGRESS NOTES
Spoke with pt   Reviewed post op dates, explained schedule is full and need to talk with md to figure out timing    Reviewed preop information with pt   No further questions

## 2024-03-05 ENCOUNTER — ANESTHESIA EVENT (OUTPATIENT)
Dept: SURGERY | Facility: CLINIC | Age: 49
End: 2024-03-05
Payer: COMMERCIAL

## 2024-03-06 ENCOUNTER — ANESTHESIA (OUTPATIENT)
Dept: SURGERY | Facility: CLINIC | Age: 49
End: 2024-03-06
Payer: COMMERCIAL

## 2024-03-06 ENCOUNTER — HOSPITAL ENCOUNTER (OUTPATIENT)
Facility: CLINIC | Age: 49
Discharge: HOME | End: 2024-03-06
Attending: OBSTETRICS & GYNECOLOGY | Admitting: OBSTETRICS & GYNECOLOGY
Payer: COMMERCIAL

## 2024-03-06 VITALS
TEMPERATURE: 97.7 F | BODY MASS INDEX: 32.54 KG/M2 | RESPIRATION RATE: 12 BRPM | WEIGHT: 183.64 LBS | HEART RATE: 67 BPM | HEIGHT: 63 IN | DIASTOLIC BLOOD PRESSURE: 56 MMHG | SYSTOLIC BLOOD PRESSURE: 88 MMHG | OXYGEN SATURATION: 94 %

## 2024-03-06 DIAGNOSIS — C76.3 SEROUS CARCINOMA OF FEMALE PELVIS (H): Primary | ICD-10-CM

## 2024-03-06 DIAGNOSIS — Z98.890 S/P LAPAROSCOPIC PROCEDURE: ICD-10-CM

## 2024-03-06 LAB
ABO/RH(D): NORMAL
ANTIBODY SCREEN: NEGATIVE
HCG UR QL: NEGATIVE
HGB BLD-MCNC: 13.3 G/DL (ref 11.7–15.7)
POTASSIUM SERPL-SCNC: 3.8 MMOL/L (ref 3.4–5.3)
SPECIMEN EXPIRATION DATE: NORMAL

## 2024-03-06 PROCEDURE — 250N000011 HC RX IP 250 OP 636: Performed by: OBSTETRICS & GYNECOLOGY

## 2024-03-06 PROCEDURE — 81025 URINE PREGNANCY TEST: CPT | Performed by: OBSTETRICS & GYNECOLOGY

## 2024-03-06 PROCEDURE — 84132 ASSAY OF SERUM POTASSIUM: CPT | Performed by: OBSTETRICS & GYNECOLOGY

## 2024-03-06 PROCEDURE — 258N000003 HC RX IP 258 OP 636: Performed by: STUDENT IN AN ORGANIZED HEALTH CARE EDUCATION/TRAINING PROGRAM

## 2024-03-06 PROCEDURE — 58661 LAPAROSCOPY REMOVE ADNEXA: CPT | Performed by: NURSE ANESTHETIST, CERTIFIED REGISTERED

## 2024-03-06 PROCEDURE — 258N000003 HC RX IP 258 OP 636: Performed by: NURSE ANESTHETIST, CERTIFIED REGISTERED

## 2024-03-06 PROCEDURE — 88331 PATH CONSLTJ SURG 1 BLK 1SPC: CPT | Mod: 26 | Performed by: PATHOLOGY

## 2024-03-06 PROCEDURE — 360N000076 HC SURGERY LEVEL 3, PER MIN: Performed by: OBSTETRICS & GYNECOLOGY

## 2024-03-06 PROCEDURE — 710N000010 HC RECOVERY PHASE 1, LEVEL 2, PER MIN: Performed by: OBSTETRICS & GYNECOLOGY

## 2024-03-06 PROCEDURE — 272N000001 HC OR GENERAL SUPPLY STERILE: Performed by: OBSTETRICS & GYNECOLOGY

## 2024-03-06 PROCEDURE — 250N000011 HC RX IP 250 OP 636

## 2024-03-06 PROCEDURE — 250N000011 HC RX IP 250 OP 636: Performed by: NURSE ANESTHETIST, CERTIFIED REGISTERED

## 2024-03-06 PROCEDURE — 250N000009 HC RX 250: Performed by: STUDENT IN AN ORGANIZED HEALTH CARE EDUCATION/TRAINING PROGRAM

## 2024-03-06 PROCEDURE — 999N000141 HC STATISTIC PRE-PROCEDURE NURSING ASSESSMENT: Performed by: OBSTETRICS & GYNECOLOGY

## 2024-03-06 PROCEDURE — 85018 HEMOGLOBIN: CPT | Performed by: OBSTETRICS & GYNECOLOGY

## 2024-03-06 PROCEDURE — 250N000011 HC RX IP 250 OP 636: Performed by: STUDENT IN AN ORGANIZED HEALTH CARE EDUCATION/TRAINING PROGRAM

## 2024-03-06 PROCEDURE — 86900 BLOOD TYPING SEROLOGIC ABO: CPT | Performed by: OBSTETRICS & GYNECOLOGY

## 2024-03-06 PROCEDURE — 250N000025 HC SEVOFLURANE, PER MIN: Performed by: OBSTETRICS & GYNECOLOGY

## 2024-03-06 PROCEDURE — 88305 TISSUE EXAM BY PATHOLOGIST: CPT | Mod: 26 | Performed by: PATHOLOGY

## 2024-03-06 PROCEDURE — 250N000013 HC RX MED GY IP 250 OP 250 PS 637

## 2024-03-06 PROCEDURE — 250N000009 HC RX 250: Performed by: NURSE ANESTHETIST, CERTIFIED REGISTERED

## 2024-03-06 PROCEDURE — 88307 TISSUE EXAM BY PATHOLOGIST: CPT | Mod: 26 | Performed by: PATHOLOGY

## 2024-03-06 PROCEDURE — 710N000012 HC RECOVERY PHASE 2, PER MINUTE: Performed by: OBSTETRICS & GYNECOLOGY

## 2024-03-06 PROCEDURE — 88305 TISSUE EXAM BY PATHOLOGIST: CPT | Mod: TC | Performed by: OBSTETRICS & GYNECOLOGY

## 2024-03-06 PROCEDURE — 36415 COLL VENOUS BLD VENIPUNCTURE: CPT | Performed by: OBSTETRICS & GYNECOLOGY

## 2024-03-06 PROCEDURE — 250N000009 HC RX 250

## 2024-03-06 PROCEDURE — 258N000003 HC RX IP 258 OP 636

## 2024-03-06 PROCEDURE — 88112 CYTOPATH CELL ENHANCE TECH: CPT | Mod: 26 | Performed by: PATHOLOGY

## 2024-03-06 PROCEDURE — 58661 LAPAROSCOPY REMOVE ADNEXA: CPT | Performed by: STUDENT IN AN ORGANIZED HEALTH CARE EDUCATION/TRAINING PROGRAM

## 2024-03-06 PROCEDURE — 88331 PATH CONSLTJ SURG 1 BLK 1SPC: CPT | Mod: TC | Performed by: OBSTETRICS & GYNECOLOGY

## 2024-03-06 PROCEDURE — 370N000017 HC ANESTHESIA TECHNICAL FEE, PER MIN: Performed by: OBSTETRICS & GYNECOLOGY

## 2024-03-06 RX ORDER — HYDROMORPHONE HCL IN WATER/PF 6 MG/30 ML
0.4 PATIENT CONTROLLED ANALGESIA SYRINGE INTRAVENOUS EVERY 5 MIN PRN
Status: DISCONTINUED | OUTPATIENT
Start: 2024-03-06 | End: 2024-03-06 | Stop reason: HOSPADM

## 2024-03-06 RX ORDER — ALBUTEROL SULFATE 0.83 MG/ML
2.5 SOLUTION RESPIRATORY (INHALATION) EVERY 4 HOURS PRN
Status: DISCONTINUED | OUTPATIENT
Start: 2024-03-06 | End: 2024-03-06 | Stop reason: HOSPADM

## 2024-03-06 RX ORDER — ONDANSETRON 4 MG/1
4 TABLET, ORALLY DISINTEGRATING ORAL EVERY 30 MIN PRN
Status: DISCONTINUED | OUTPATIENT
Start: 2024-03-06 | End: 2024-03-06 | Stop reason: HOSPADM

## 2024-03-06 RX ORDER — PROPOFOL 10 MG/ML
INJECTION, EMULSION INTRAVENOUS PRN
Status: DISCONTINUED | OUTPATIENT
Start: 2024-03-06 | End: 2024-03-06

## 2024-03-06 RX ORDER — SODIUM CHLORIDE, SODIUM LACTATE, POTASSIUM CHLORIDE, CALCIUM CHLORIDE 600; 310; 30; 20 MG/100ML; MG/100ML; MG/100ML; MG/100ML
INJECTION, SOLUTION INTRAVENOUS CONTINUOUS
Status: DISCONTINUED | OUTPATIENT
Start: 2024-03-06 | End: 2024-03-06 | Stop reason: HOSPADM

## 2024-03-06 RX ORDER — NALOXONE HYDROCHLORIDE 0.4 MG/ML
0.1 INJECTION, SOLUTION INTRAMUSCULAR; INTRAVENOUS; SUBCUTANEOUS
Status: DISCONTINUED | OUTPATIENT
Start: 2024-03-06 | End: 2024-03-06 | Stop reason: HOSPADM

## 2024-03-06 RX ORDER — HYDROMORPHONE HYDROCHLORIDE 2 MG/1
2 TABLET ORAL EVERY 6 HOURS PRN
Qty: 6 TABLET | Refills: 0 | Status: SHIPPED | OUTPATIENT
Start: 2024-03-06

## 2024-03-06 RX ORDER — HYDROMORPHONE HYDROCHLORIDE 2 MG/1
2 TABLET ORAL
Status: COMPLETED | OUTPATIENT
Start: 2024-03-06 | End: 2024-03-06

## 2024-03-06 RX ORDER — ACETAMINOPHEN 325 MG/1
975 TABLET ORAL EVERY 6 HOURS PRN
Status: DISCONTINUED | OUTPATIENT
Start: 2024-03-06 | End: 2024-03-06 | Stop reason: HOSPADM

## 2024-03-06 RX ORDER — ACETAMINOPHEN 325 MG/1
975 TABLET ORAL ONCE
Status: COMPLETED | OUTPATIENT
Start: 2024-03-06 | End: 2024-03-06

## 2024-03-06 RX ORDER — IBUPROFEN 400 MG/1
800 TABLET, FILM COATED ORAL ONCE
Status: DISCONTINUED | OUTPATIENT
Start: 2024-03-07 | End: 2024-03-06 | Stop reason: HOSPADM

## 2024-03-06 RX ORDER — ONDANSETRON 4 MG/1
4 TABLET, ORALLY DISINTEGRATING ORAL EVERY 8 HOURS PRN
Qty: 6 TABLET | Refills: 0 | Status: SHIPPED | OUTPATIENT
Start: 2024-03-06

## 2024-03-06 RX ORDER — SCOLOPAMINE TRANSDERMAL SYSTEM 1 MG/1
1 PATCH, EXTENDED RELEASE TRANSDERMAL
Status: DISCONTINUED | OUTPATIENT
Start: 2024-03-06 | End: 2024-03-06 | Stop reason: HOSPADM

## 2024-03-06 RX ORDER — NALOXONE HYDROCHLORIDE 0.4 MG/ML
0.2 INJECTION, SOLUTION INTRAMUSCULAR; INTRAVENOUS; SUBCUTANEOUS
Status: DISCONTINUED | OUTPATIENT
Start: 2024-03-06 | End: 2024-03-06 | Stop reason: HOSPADM

## 2024-03-06 RX ORDER — ACETAMINOPHEN 325 MG/1
975 TABLET ORAL EVERY 6 HOURS PRN
COMMUNITY
Start: 2024-03-06

## 2024-03-06 RX ORDER — LABETALOL HYDROCHLORIDE 5 MG/ML
10 INJECTION, SOLUTION INTRAVENOUS
Status: DISCONTINUED | OUTPATIENT
Start: 2024-03-06 | End: 2024-03-06 | Stop reason: HOSPADM

## 2024-03-06 RX ORDER — ONDANSETRON 2 MG/ML
4 INJECTION INTRAMUSCULAR; INTRAVENOUS EVERY 30 MIN PRN
Status: DISCONTINUED | OUTPATIENT
Start: 2024-03-06 | End: 2024-03-06 | Stop reason: HOSPADM

## 2024-03-06 RX ORDER — FENTANYL CITRATE 50 UG/ML
INJECTION, SOLUTION INTRAMUSCULAR; INTRAVENOUS PRN
Status: DISCONTINUED | OUTPATIENT
Start: 2024-03-06 | End: 2024-03-06

## 2024-03-06 RX ORDER — LIDOCAINE HYDROCHLORIDE 20 MG/ML
INJECTION, SOLUTION INFILTRATION; PERINEURAL PRN
Status: DISCONTINUED | OUTPATIENT
Start: 2024-03-06 | End: 2024-03-06

## 2024-03-06 RX ORDER — HYDROMORPHONE HCL IN WATER/PF 6 MG/30 ML
0.2 PATIENT CONTROLLED ANALGESIA SYRINGE INTRAVENOUS EVERY 5 MIN PRN
Status: DISCONTINUED | OUTPATIENT
Start: 2024-03-06 | End: 2024-03-06 | Stop reason: HOSPADM

## 2024-03-06 RX ORDER — HALOPERIDOL 5 MG/ML
1 INJECTION INTRAMUSCULAR
Status: DISCONTINUED | OUTPATIENT
Start: 2024-03-06 | End: 2024-03-06 | Stop reason: HOSPADM

## 2024-03-06 RX ORDER — PROPOFOL 10 MG/ML
INJECTION, EMULSION INTRAVENOUS CONTINUOUS PRN
Status: DISCONTINUED | OUTPATIENT
Start: 2024-03-06 | End: 2024-03-06

## 2024-03-06 RX ORDER — FENTANYL CITRATE 50 UG/ML
25 INJECTION, SOLUTION INTRAMUSCULAR; INTRAVENOUS EVERY 5 MIN PRN
Status: DISCONTINUED | OUTPATIENT
Start: 2024-03-06 | End: 2024-03-06 | Stop reason: HOSPADM

## 2024-03-06 RX ORDER — FENTANYL CITRATE 50 UG/ML
25-50 INJECTION, SOLUTION INTRAMUSCULAR; INTRAVENOUS
Status: DISCONTINUED | OUTPATIENT
Start: 2024-03-06 | End: 2024-03-06 | Stop reason: HOSPADM

## 2024-03-06 RX ORDER — NALOXONE HYDROCHLORIDE 0.4 MG/ML
0.4 INJECTION, SOLUTION INTRAMUSCULAR; INTRAVENOUS; SUBCUTANEOUS
Status: DISCONTINUED | OUTPATIENT
Start: 2024-03-06 | End: 2024-03-06 | Stop reason: HOSPADM

## 2024-03-06 RX ORDER — FLUMAZENIL 0.1 MG/ML
0.2 INJECTION, SOLUTION INTRAVENOUS
Status: DISCONTINUED | OUTPATIENT
Start: 2024-03-06 | End: 2024-03-06 | Stop reason: HOSPADM

## 2024-03-06 RX ORDER — DEXAMETHASONE SODIUM PHOSPHATE 4 MG/ML
INJECTION, SOLUTION INTRA-ARTICULAR; INTRALESIONAL; INTRAMUSCULAR; INTRAVENOUS; SOFT TISSUE PRN
Status: DISCONTINUED | OUTPATIENT
Start: 2024-03-06 | End: 2024-03-06

## 2024-03-06 RX ORDER — IBUPROFEN 800 MG/1
800 TABLET, FILM COATED ORAL EVERY 6 HOURS PRN
COMMUNITY
Start: 2024-03-06

## 2024-03-06 RX ORDER — HYDRALAZINE HYDROCHLORIDE 20 MG/ML
2.5-5 INJECTION INTRAMUSCULAR; INTRAVENOUS EVERY 10 MIN PRN
Status: DISCONTINUED | OUTPATIENT
Start: 2024-03-06 | End: 2024-03-06 | Stop reason: HOSPADM

## 2024-03-06 RX ORDER — OXYCODONE HYDROCHLORIDE 5 MG/1
5 TABLET ORAL EVERY 4 HOURS PRN
Status: DISCONTINUED | OUTPATIENT
Start: 2024-03-06 | End: 2024-03-06 | Stop reason: HOSPADM

## 2024-03-06 RX ORDER — SODIUM CHLORIDE, SODIUM LACTATE, POTASSIUM CHLORIDE, CALCIUM CHLORIDE 600; 310; 30; 20 MG/100ML; MG/100ML; MG/100ML; MG/100ML
INJECTION, SOLUTION INTRAVENOUS CONTINUOUS PRN
Status: DISCONTINUED | OUTPATIENT
Start: 2024-03-06 | End: 2024-03-06

## 2024-03-06 RX ORDER — ONDANSETRON 2 MG/ML
INJECTION INTRAMUSCULAR; INTRAVENOUS PRN
Status: DISCONTINUED | OUTPATIENT
Start: 2024-03-06 | End: 2024-03-06

## 2024-03-06 RX ORDER — HEPARIN SODIUM 5000 [USP'U]/.5ML
5000 INJECTION, SOLUTION INTRAVENOUS; SUBCUTANEOUS
Status: COMPLETED | OUTPATIENT
Start: 2024-03-06 | End: 2024-03-06

## 2024-03-06 RX ORDER — AMOXICILLIN 250 MG
1-2 CAPSULE ORAL 2 TIMES DAILY
Qty: 30 TABLET | Refills: 0 | Status: SHIPPED | OUTPATIENT
Start: 2024-03-06

## 2024-03-06 RX ORDER — FENTANYL CITRATE 50 UG/ML
50 INJECTION, SOLUTION INTRAMUSCULAR; INTRAVENOUS EVERY 5 MIN PRN
Status: DISCONTINUED | OUTPATIENT
Start: 2024-03-06 | End: 2024-03-06 | Stop reason: HOSPADM

## 2024-03-06 RX ADMIN — SODIUM CHLORIDE, POTASSIUM CHLORIDE, SODIUM LACTATE AND CALCIUM CHLORIDE: 600; 310; 30; 20 INJECTION, SOLUTION INTRAVENOUS at 15:19

## 2024-03-06 RX ADMIN — FOSAPREPITANT 150 MG: 150 INJECTION, POWDER, LYOPHILIZED, FOR SOLUTION INTRAVENOUS at 12:54

## 2024-03-06 RX ADMIN — Medication 10 MG: at 15:48

## 2024-03-06 RX ADMIN — SUGAMMADEX 200 MG: 100 INJECTION, SOLUTION INTRAVENOUS at 16:16

## 2024-03-06 RX ADMIN — PROCHLORPERAZINE EDISYLATE 5 MG: 5 INJECTION, SOLUTION INTRAMUSCULAR; INTRAVENOUS at 18:39

## 2024-03-06 RX ADMIN — HYDROMORPHONE HYDROCHLORIDE 0.5 MG: 1 INJECTION, SOLUTION INTRAMUSCULAR; INTRAVENOUS; SUBCUTANEOUS at 16:15

## 2024-03-06 RX ADMIN — PHENYLEPHRINE HYDROCHLORIDE 50 MCG: 10 INJECTION INTRAVENOUS at 14:39

## 2024-03-06 RX ADMIN — SODIUM CHLORIDE, POTASSIUM CHLORIDE, SODIUM LACTATE AND CALCIUM CHLORIDE: 600; 310; 30; 20 INJECTION, SOLUTION INTRAVENOUS at 14:12

## 2024-03-06 RX ADMIN — Medication 50 MG: at 14:21

## 2024-03-06 RX ADMIN — MIDAZOLAM 2 MG: 1 INJECTION INTRAMUSCULAR; INTRAVENOUS at 14:10

## 2024-03-06 RX ADMIN — PHENYLEPHRINE HYDROCHLORIDE 100 MCG: 10 INJECTION INTRAVENOUS at 15:22

## 2024-03-06 RX ADMIN — FENTANYL CITRATE 100 MCG: 50 INJECTION INTRAMUSCULAR; INTRAVENOUS at 16:25

## 2024-03-06 RX ADMIN — ACETAMINOPHEN 975 MG: 325 TABLET, FILM COATED ORAL at 17:30

## 2024-03-06 RX ADMIN — Medication 20 MG: at 15:12

## 2024-03-06 RX ADMIN — ONDANSETRON 4 MG: 2 INJECTION INTRAMUSCULAR; INTRAVENOUS at 16:04

## 2024-03-06 RX ADMIN — PHENYLEPHRINE HYDROCHLORIDE 100 MCG: 10 INJECTION INTRAVENOUS at 14:55

## 2024-03-06 RX ADMIN — ONDANSETRON 4 MG: 2 INJECTION INTRAMUSCULAR; INTRAVENOUS at 18:22

## 2024-03-06 RX ADMIN — DEXAMETHASONE SODIUM PHOSPHATE 4 MG: 4 INJECTION, SOLUTION INTRA-ARTICULAR; INTRALESIONAL; INTRAMUSCULAR; INTRAVENOUS; SOFT TISSUE at 14:52

## 2024-03-06 RX ADMIN — HEPARIN SODIUM 5000 UNITS: 5000 INJECTION, SOLUTION INTRAVENOUS; SUBCUTANEOUS at 12:05

## 2024-03-06 RX ADMIN — SCOPALAMINE 1 PATCH: 1 PATCH, EXTENDED RELEASE TRANSDERMAL at 12:32

## 2024-03-06 RX ADMIN — LIDOCAINE HYDROCHLORIDE 80 MG: 20 INJECTION, SOLUTION INFILTRATION; PERINEURAL at 14:18

## 2024-03-06 RX ADMIN — FENTANYL CITRATE 100 MCG: 50 INJECTION INTRAMUSCULAR; INTRAVENOUS at 14:18

## 2024-03-06 RX ADMIN — HYDROMORPHONE HYDROCHLORIDE 2 MG: 2 TABLET ORAL at 17:31

## 2024-03-06 RX ADMIN — PROPOFOL 150 MCG/KG/MIN: 10 INJECTION, EMULSION INTRAVENOUS at 14:22

## 2024-03-06 RX ADMIN — PROPOFOL 150 MG: 10 INJECTION, EMULSION INTRAVENOUS at 14:21

## 2024-03-06 RX ADMIN — HYDROMORPHONE HYDROCHLORIDE 0.5 MG: 1 INJECTION, SOLUTION INTRAMUSCULAR; INTRAVENOUS; SUBCUTANEOUS at 16:01

## 2024-03-06 RX ADMIN — PHENYLEPHRINE HYDROCHLORIDE 100 MCG: 10 INJECTION INTRAVENOUS at 14:57

## 2024-03-06 RX ADMIN — PHENYLEPHRINE HYDROCHLORIDE 50 MCG: 10 INJECTION INTRAVENOUS at 14:46

## 2024-03-06 RX ADMIN — PHENYLEPHRINE HYDROCHLORIDE 100 MCG: 10 INJECTION INTRAVENOUS at 15:47

## 2024-03-06 ASSESSMENT — ACTIVITIES OF DAILY LIVING (ADL)
ADLS_ACUITY_SCORE: 35
ADLS_ACUITY_SCORE: 35
ADLS_ACUITY_SCORE: 37
ADLS_ACUITY_SCORE: 36
ADLS_ACUITY_SCORE: 33
ADLS_ACUITY_SCORE: 35

## 2024-03-06 NOTE — ANESTHESIA CARE TRANSFER NOTE
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    Anesthesia Type:   General     Note:    Oropharynx: oropharynx clear of all foreign objects and spontaneously breathing  Level of Consciousness: awake  Oxygen Supplementation: face mask    Independent Airway: airway patency satisfactory and stable  Dentition: dentition unchanged  Vital Signs Stable: post-procedure vital signs reviewed and stable  Report to RN Given: handoff report given  Patient transferred to: PACU    Handoff Report: Identifed the Patient, Identified the Reponsible Provider, Reviewed the pertinent medical history, Discussed the surgical course, Reviewed Intra-OP anesthesia mangement and issues during anesthesia, Set expectations for post-procedure period and Allowed opportunity for questions and acknowledgement of understanding      Vitals:  Vitals Value Taken Time   /54 03/06/24 1629   Temp 36.7    Pulse 58 03/06/24 1632   Resp 8 03/06/24 1632   SpO2 100 % 03/06/24 1632   Vitals shown include unfiled device data.    Electronically Signed By: Charles Patrick Schlatter, APRN CRNA  March 6, 2024  4:33 PM

## 2024-03-06 NOTE — TELEPHONE ENCOUNTER
Action March 6, 2024 3:56 PM    Action Taken Slides from [facility] received and taken to 5th floor path lab for review.  Case: [id number] (24 slides)

## 2024-03-06 NOTE — ANESTHESIA PROCEDURE NOTES
Airway       Patient location during procedure: OR       Procedure Start/Stop Times: 3/6/2024 2:25 PM  Staff -        Anesthesiologist:  Marleny Rosario MD       Resident/Fellow: Jw Desai MD       Performed By: residentIndications and Patient Condition       Indications for airway management: yenny-procedural         Mask difficulty assessment: 1 - vent by mask    Final Airway Details       Final airway type: endotracheal airway       Successful airway: ETT - single  Endotracheal Airway Details        ETT size (mm): 7.0       Successful intubation technique: direct laryngoscopy       DL Blade Type: MAC 3       Grade View of Cords: 1       Adjucts: stylet       Position: Right       Measured from: lips       Secured at (cm): 22       Bite block used: None    Post intubation assessment        Placement verified by: capnometry, equal breath sounds and chest rise        Number of attempts at approach: 1       Number of other approaches attempted: 0       Secured with: tape       Ease of procedure: easy       Dentition: Intact    Medication(s) Administered   Medication Administration Time: 3/6/2024 2:25 PM

## 2024-03-06 NOTE — OP NOTE
[facility] Gynecologic Operative Note   Jonelle Hart  [id number]  3/6/2024    Preoperative Diagnosis:    Malignant neoplasm of fallopian tube, Right     Postoperative Diagnosis:   Same as above, s/p procedure below      Procedure: Laparoscopic omental biopsy, peritoneal washings, diaphragm washings, peritoneal biopsies, bilateral oophorectomy, infracolic omentectomy    Surgeon: Mimi Loyola MD      Assistant(s): Bridgett Ceron MD, PGY1     Anesthesia: General endotracheal     Complications: none     EBL: 20 mL   IVF: 1400 mL crystalloid   UOP: 175 mL clear urine     Findings:  EUA not performed. Laparoscopy revealed atraumatic entry, omental and bowel adhesions on pelvic side walls, normal liver, stomach edge. Appendix adherent to vaginal cuff. Large, serous appearing right ovarian cyst, normal appearing left ovary. Benign R ovary, benign peritoneal biopsy on frozen pathological examination. Right ovary drained in endo catch bag, clear serous fluid. Absent uterus, absent fallopian tubes. All pedicles hemostatic at end of case.      Indications: Jonelle Hart is a 48 year old female who presented to clinic following a TLH, BS with benign gynecology for AUB with an incidental finding on pathology of a STIC lesion within the right fallopian tube. A bilateral oophorectomy with pelvic washings, omental and peritoneal biopsies was recommended at that time. All risks, benefits and alternatives were discussed and written informed consent was obtained.     Procedure: The patient was taken to the operating room where she was placed in the dorsal lithotomy position with feet in yellow fin stirrups. General endotracheal anesthesia was administered. The patient was then prepped and draped in the usual sterile fashion. A lima catheter was placed. Attention was then turned to the abdomen where a 15-blade scalpel was used to make a small vertical incision within the umbilicus. The veres needle was inserted into the incision with an  opening pressure of < 5 mmHG.  Pneumoperitoneum was achieved with good tympany of the abdomen, maximum pressure of 15 mmHg. Veres needle removed, and 5 mm port was placed through umbilical incision. The 5 mm scope was placed in the port, revealing atraumatic entry. Abdominal survey completed with findings as above. Attention was turned to the RLQ of the abdomen where a site 4 cm medial and 2 cm superior to the anterior superior iliac crest was noted to be free of major blood vessels, 0.25% bupivicaine injected, and a 5 mm horizontal skin incision made. A 5 mm port was placed under visualization within the abdomen. A 12 mm port was placed in the RLQ of the abdomen with similar technique under visualization.     Right and left diaphragm washings, as well as pelvic washings were then obtained and sent to pathology separately. A small nodule of right pelvic peritoneum was removed bluntly and sent for frozen, which returned as benign. Inspection of pelvis revealed findings as above. The right ureter was identified, and noted to be outside of the field of operation. The ovary was grasped with atraumatic grasper and lifted into view. The ligasure was used to cauterize the right infundubilopelvic ligament three times, then used to cauterize and cut the ligament 1 cm from the right ovary. The ovary was placed in the pelvis. Attention was then turned to the left ovary. Unable to visualize left ureter, so retroperitoneal dissection was performed. The peritoneum just superio-lateral to the IP ligament was elevated, and blunt dissection as well as electrocautery were used until the ureter could be visualized vermiculating. The left ovary was then removed in a similar fashion. An endo-catch bag was placed in the 12 mm port, bilateral ovaries were placed in the bag and brought to the abdominal wall. We were unable to remove the bag through the port site intact, so a needle was used to incise the cyst wall. Clear, serous fluid was  suctioned into suction cannister. Top gloves were changed at this point. Bilateral ovaries and cyst wall were then removed from abdominal cavity within the endo catch bag. Right ovary was sent for frozen examination in pathology, which came back as benign.    The 12 mm port was replaced in th RLQ incision. Hemostasis of bilateral pedicles was noted. The right pelvic peritoneum was then grasped with an atraumatic grasper, elevated away from abdominal wall, and a small biopsy was taken and sent to pathology. Biopsies of right paracolic gutter, left pelvic side wall, and left paracolic gutter were similarly obtained. Biopsy sites hemostatic.  Attention was then turned to the omentum. A partial, infra colic omentectomy was performed by mobilizing omentum off of descending colon, which was divided and ligated using the Ligasure. Bowel inspected and noted to be free of injury. Omental edges hemostatic. Endo catch was placed through 12 mm port, and omentum was brought to abdominal wall. It was unable to be removed through port site initially, so a ring forceps was used to remove pieces of omentum until bag was able to be pulled through.    A final visual sweep of the pelvis showed no further pathology, with good hemostasis. The 12 mm port site was closed using Krzysztof-Xie insert and needle with 0-vicryl stitch x 2. Defect in fascia still felt despite this. Externally through the port site, the fascia was grasped and elevated with a jazmin. A 2-0 vicryl was used to close remaining fascial defect via a simple running stitch. This port site was then irrigated with normal saline. The remaining ports were removed and pneumoperitoneum was expelled. The skin incisions were closed using 4-0 vicryl. 10 ml of additional 0.25% bupivicaine were injected into the port sites, which were covered with steri strips and primi pore dressings. Palacios catheter was removed.      Instrument, sponge, and needle counts were correct times 2.  Dr. Loyola was present and scrubbed for the entire procedure. The patient was extubated in the operating room and transferred to the PACU in stable condition.    Bridgett Ceron MD  Obstetrics and Gynecology, PGY-1  03/06/24 1:44 PM    I was scrubbed and present for the entire procedure.  Mimi Loyola MD  Professor  Department of Ob/Gyn and Women's Health  Division of Gynecologic Oncology  [facility]  [phone]

## 2024-03-06 NOTE — ANESTHESIA PREPROCEDURE EVALUATION
"Anesthesia Pre-Procedure Evaluation    Patient: Jonelle Hart   MRN: [id number] : 1975        Procedure : Procedure(s):  Laparoscopic omental biopsy, peritoneal washings, peritoneal biopsies, possible removal of bilateral ovaries          History reviewed. No pertinent past medical history.   History reviewed. No pertinent surgical history.   Allergies   Allergen Reactions    Erythromycin Rash     GI upset      Social History     Tobacco Use    Smoking status: Never    Smokeless tobacco: Never   Substance Use Topics    Alcohol use: Never      Wt Readings from Last 1 Encounters:   24 83.3 kg (183 lb 10.3 oz)        Anesthesia Evaluation   Pt has had prior anesthetic.     No history of anesthetic complications       ROS/MED HX  ENT/Pulmonary:  - neg pulmonary ROS     Neurologic:  - neg neurologic ROS     Cardiovascular:  - neg cardiovascular ROS     METS/Exercise Tolerance:     Hematologic:  - neg hematologic  ROS     Musculoskeletal:  - neg musculoskeletal ROS     GI/Hepatic:  - neg GI/hepatic ROS     Renal/Genitourinary:  - neg Renal ROS     Endo:     (+)               Obesity,       Psychiatric/Substance Use:  - neg psychiatric ROS     Infectious Disease:  - neg infectious disease ROS     Malignancy: Comment: Fallopian tube carcinoma  (+) Malignancy,     Other:            Physical Exam    Airway        Mallampati: I   TM distance: > 3 FB   Neck ROM: full   Mouth opening: > 3 cm    Respiratory Devices and Support         Dental       (+) Completely normal teeth      Cardiovascular   cardiovascular exam normal       Rhythm and rate: regular and normal     Pulmonary   pulmonary exam normal        breath sounds clear to auscultation           OUTSIDE LABS:  CBC:   Lab Results   Component Value Date    HGB 13.3 2024     BMP: No results found for: \"NA\", \"POTASSIUM\", \"CHLORIDE\", \"CO2\", \"BUN\", \"CR\", \"GLC\"  COAGS: No results found for: \"PTT\", \"INR\", \"FIBR\"  POC: No results found for: \"BGM\", \"HCG\", " "\"HCGS\"  HEPATIC: No results found for: \"ALBUMIN\", \"PROTTOTAL\", \"ALT\", \"AST\", \"GGT\", \"ALKPHOS\", \"BILITOTAL\", \"BILIDIRECT\", \"TONI\"  OTHER: No results found for: \"PH\", \"LACT\", \"A1C\", \"MARIO\", \"PHOS\", \"MAG\", \"LIPASE\", \"AMYLASE\", \"TSH\", \"T4\", \"T3\", \"CRP\", \"SED\"    Anesthesia Plan    ASA Status:  2    NPO Status:  NPO Appropriate    Anesthesia Type: General.     - Airway: ETT   Induction: Intravenous.   Maintenance: TIVA.   Techniques and Equipment:     - Lines/Monitors: BIS, 2nd IV     Consents    Anesthesia Plan(s) and associated risks, benefits, and realistic alternatives discussed. Questions answered and patient/representative(s) expressed understanding.     - Discussed: Risks, Benefits and Alternatives for BOTH SEDATION and the PROCEDURE were discussed     - Discussed with:  Patient       - Patient is DNR/DNI Status: No     Use of blood products discussed: No .     Postoperative Care    Pain management: IV analgesics, Oral pain medications.   PONV prophylaxis: Ondansetron (or other 5HT-3), Dexamethasone or Solumedrol, Scopolamine patch, Aprepitant, Background Propofol Infusion     Comments:    Other Comments: 49 yo female Pmhx neoplasm of fallopian tube presenting for laparoscopic omental biopsy, hx of PONV. Plan GETA (TIVA) with standard ASA monitors + BIS and 2nd PIV.            Martha Rojas MD    I have reviewed the pertinent notes and labs in the chart from the past 30 days and (re)examined the patient.  Any updates or changes from those notes are reflected in this note.              # Obesity: Estimated body mass index is 32.53 kg/m  as calculated from the following:    Height as of this encounter: 1.6 m (5' 3\").    Weight as of this encounter: 83.3 kg (183 lb 10.3 oz).      "

## 2024-03-06 NOTE — BRIEF OP NOTE
[facility]    Brief Operative Note    Pre-operative diagnosis: Malignant neoplasm of fallopian tube, left (H) [C57.02]  Post-operative diagnosis Same as pre-operative diagnosis    Procedure: Laparoscopic omental biopsy, peritoneal washings, diaphragm washings,  peritoneal biopsies, bilateral oophrectomy, intracolic omentectomy,, Bilateral - Abdomen    Surgeon: Surgeon(s) and Role:     * Mimi Loyola MD - Primary     * Bridgett Ceron MD - Resident - Assisting  Anesthesia: General   Estimated Blood Loss: 20 ml    Drains: None  Specimens:   ID Type Source Tests Collected by Time Destination   1 : Pelvic biopsy Tissue Pelvis SURGICAL PATHOLOGY EXAM Mimi Loyola MD 3/6/2024  2:55 PM    2 : Right diaphragm washings Washings Diaphragm, Right NON-GYNECOLOGIC CYTOLOGY Mimi Loyola MD 3/6/2024  3:02 PM    3 : Left diaphragm washings Washings Diaphragm, Left NON-GYNECOLOGIC CYTOLOGY Mimi Loyola MD 3/6/2024  3:02 PM    4 : Pelvic washings Washings Pelvis NON-GYNECOLOGIC CYTOLOGY Mimi Loyola MD 3/6/2024  3:03 PM    5 : Right ovary Tissue Ovary, Right SURGICAL PATHOLOGY EXAM Mimi Loyola MD 3/6/2024  3:23 PM    6 : Left ovary Tissue Ovary, Left SURGICAL PATHOLOGY EXAM Mimi Loyola MD 3/6/2024  3:26 PM    7 : Right pelvic peritoneal biopsy Tissue Pelvis, Right SURGICAL PATHOLOGY EXAM Mimi Loyola MD 3/6/2024  3:26 PM    8 : Paracolic Gutter, Right Tissue Paracolic Gutter, Right SURGICAL PATHOLOGY EXAM Mimi Loyola MD 3/6/2024  3:29 PM    9 : Left pelvic side wall Tissue Pelvis, Left SURGICAL PATHOLOGY EXAM Mimi Loyola MD 3/6/2024  3:32 PM    10 : Paracolic Gutter, Left Tissue Paracolic Gutter, Left SURGICAL PATHOLOGY EXAM Mimi Loyola MD 3/6/2024  3:34 PM    11 : Omentum Tissue Omentum SURGICAL PATHOLOGY EXAM Mimi Loyola MD 3/6/2024  4:01 PM      Findings:  EUA not performed.  Laparoscopy revealed atraumatic entry, omental and bowel adhesions on pelvic side walls, normal liver, stomach edge. Appendix adherent to vaginal cuff. Large, serous appearing right ovary, normal appearing left ovary. Benign ovarian cyst on frozen. Right ovary drained in endo catch bag, clear serous fluid. Absent uterus, absent fallopian tubes. All pedicles hemostatic at end of case.     Complications: None.  Implants: * No implants in log *    Dr. Loyola was present and scrubbed for entirety of case.     Bridgett Ceron MD  Obstetrics and Gynecology, PGY-1  03/06/24 4:28 PM         - - -

## 2024-03-06 NOTE — ANESTHESIA POSTPROCEDURE EVALUATION
Patient: Jonelle Hart    Procedure: Procedure(s):  Laparoscopic omental biopsy, peritoneal washings, diaphragm washings,  peritoneal biopsies, bilateral oophrectomy, intracolic omentectomy,       Anesthesia Type:  General    Note:  Disposition: Outpatient   Postop Pain Control: Uneventful            Sign Out: Well controlled pain   PONV: No   Neuro/Psych: Uneventful            Sign Out: Acceptable/Baseline neuro status   Airway/Respiratory: Uneventful            Sign Out: Acceptable/Baseline resp. status   CV/Hemodynamics: Uneventful            Sign Out: Acceptable CV status; No obvious hypovolemia; No obvious fluid overload   Other NRE: NONE   DID A NON-ROUTINE EVENT OCCUR? No       Last vitals:  Vitals Value Taken Time   BP 83/59 03/06/24 1730   Temp 36.4  C (97.6  F) 03/06/24 1630   Pulse 54 03/06/24 1734   Resp 10 03/06/24 1734   SpO2 96 % 03/06/24 1734   Vitals shown include unfiled device data.    Electronically Signed By: Kathy San MD  March 6, 2024  5:35 PM

## 2024-03-06 NOTE — PROGRESS NOTES
Gynecologic Oncology Postoperative Check Note  3/6/2024    S: Patient reports she is doing ok postoperatively, just still feeling nauseous. Has not yet tolerated po. Reports she has struggled with PONV in the past. Pain is well controlled oral pain meds. Ambulating well. Voiding spontaneously. Denies chest pain, shortness of breath, or other concerns at this time.    O:  Vitals:    03/06/24 1740 03/06/24 1750 03/06/24 1755 03/06/24 1808   BP: 96/60 95/58  95/68   BP Location:    Right arm   Pulse: 55 51 60 57   Resp: 11 11 10 12   Temp:    98.3  F (36.8  C)   TempSrc:    Oral   SpO2: 95% 96% 95% 94%   Weight:       Height:           Gen: sitting in chair with eyes closed, NAD  Cardio: RRR, S1/S2, no murmurs  Resp: CTAB, no wheezing or crackles  Abdomen: soft, appropriately tender, incisions with Primipores in place with moderate shadowing  Extremities: Non-tender, trace edema    A: 48 year old POD#0 s/p laparoscopic omental biopsy, peritoneal washings, diaphragm washings, peritoneal biopsies, bilateral oophorectomy, infracolic omentectomy. Starting to meet goals for discharge.    Dz: STIC lesion s/p TLH, BS  FEN: Advance as tolerated. S/p zofran and a scopolamine patch, will give a dose of compazine. Anticipate discharge if nausea improved after compazine and able to tolerate po  Pain: tylenol, ibuprofen, po dilaudid PRN  GI: bowel regimen  : voiding spontaneously    Dispo: likely home pending nausea    Daylin Hwang MD  OB/GYN Resident, PGY-3        Addendum 7:15 PM   Notified by RN that patient is feeling better. Patient reports she is just very sleepy. Nausea improved, has been able to tolerate po. Requesting zofran for discharge in case nausea returns, sent to pharmacy per patient request. Ok to discharge home.    Daylin Hwang MD  OB/GYN Resident, PGY-3

## 2024-03-07 ENCOUNTER — LAB REQUISITION (OUTPATIENT)
Dept: LAB | Facility: CLINIC | Age: 49
End: 2024-03-07
Payer: COMMERCIAL

## 2024-03-07 PROCEDURE — 88321 CONSLTJ&REPRT SLD PREP ELSWR: CPT | Performed by: PATHOLOGY

## 2024-03-07 NOTE — DISCHARGE INSTRUCTIONS
Contacting your Doctor -   To contact Dr Loyola at the Gynecology-Oncology clinic call [phone]  or:  [phone] and ask for the resident on call for Gynecology (answered 24 hours a day)   Emergency Department:  [facility]: [phone] 911 if you are in need of immediate or emergent help   After Anesthesia (Sleep Medicine)  What should I do after anesthesia?  You should rest and relax for the next 24 hours. Avoid risky or difficult (strenuous) activity. A responsible adult should stay with you overnight.  Don't drive or use any heavy equipment for 24 hours. Even if you feel normal, your reactions may be affected by the sleep medicine given to you.  Don't drink alcohol or make any important decisions for 24 hours.  Slowly get back to your regular diet, as you feel able.  How should I expect to feel?  It's normal to feel dizzy, light-headed, or faint for up to a full day after anesthesia or while taking pain medicine. If this happens:   Sit down for a few minutes before standing.  Have someone help you when you get up to walk or use the bathroom.  If you have nausea (feel sick to your stomach) or vomit (throw up):   Drink clear liquids (such as apple juice, ginger ale, broth, or 7UP) until you feel better.  If you feel sick to your stomach, or you keep vomiting for 24 hours, please call the doctor.  What else should I know?  You might have a dry mouth, sore throat, muscle aches, or trouble sleeping. These should go away after 24 hours.  Please contact your doctor if you have any other symptoms that concern you, such as fever, pain, bleeding, fluid drainage, swelling, or headache, or if it's been over 8 to 10 hours and you still aren't able to pee (urinate).  If you have a history of sleep apnea, it's very important to use your CPAP machine for the next 24 hours when you nap or sleep.   For informational purposes only. Not to replace the advice of your health care provider. Copyright   2023 [employer]. All rights reserved. Clinically reviewed by Nick Smith MD. [employer] [id number] - REV 09/23.

## 2024-03-07 NOTE — OR NURSING
Patient seen by gynecology after she voided. Phase II criteria met. Education given. Questions and concerns addressed.

## 2024-03-08 ENCOUNTER — PATIENT OUTREACH (OUTPATIENT)
Dept: ONCOLOGY | Facility: CLINIC | Age: 49
End: 2024-03-08
Payer: COMMERCIAL

## 2024-03-08 LAB
PATH REPORT.COMMENTS IMP SPEC: NORMAL
PATH REPORT.FINAL DX SPEC: NORMAL
PATH REPORT.GROSS SPEC: NORMAL
PATH REPORT.MICROSCOPIC SPEC OTHER STN: NORMAL
PATH REPORT.RELEVANT HX SPEC: NORMAL

## 2024-03-10 ENCOUNTER — HEALTH MAINTENANCE LETTER (OUTPATIENT)
Age: 49
End: 2024-03-10

## 2024-03-12 LAB
PATH REPORT.COMMENTS IMP SPEC: NORMAL
PATH REPORT.COMMENTS IMP SPEC: NORMAL
PATH REPORT.FINAL DX SPEC: NORMAL
PATH REPORT.GROSS SPEC: NORMAL
PATH REPORT.MICROSCOPIC SPEC OTHER STN: NORMAL
PATH REPORT.MICROSCOPIC SPEC OTHER STN: NORMAL
PATH REPORT.RELEVANT HX SPEC: NORMAL
PATH REPORT.RELEVANT HX SPEC: NORMAL
PATH REPORT.SITE OF ORIGIN SPEC: NORMAL

## 2024-03-13 LAB
PATH REPORT.COMMENTS IMP SPEC: NORMAL
PATH REPORT.FINAL DX SPEC: NORMAL
PATH REPORT.GROSS SPEC: NORMAL
PATH REPORT.INTRAOP OBS SPEC DOC: NORMAL
PATH REPORT.MICROSCOPIC SPEC OTHER STN: NORMAL
PATH REPORT.RELEVANT HX SPEC: NORMAL
PHOTO IMAGE: NORMAL

## 2024-03-14 ENCOUNTER — ONCOLOGY VISIT (OUTPATIENT)
Dept: ONCOLOGY | Facility: CLINIC | Age: 49
End: 2024-03-14
Attending: OBSTETRICS & GYNECOLOGY
Payer: COMMERCIAL

## 2024-03-14 VITALS
HEART RATE: 86 BPM | DIASTOLIC BLOOD PRESSURE: 73 MMHG | RESPIRATION RATE: 15 BRPM | OXYGEN SATURATION: 97 % | TEMPERATURE: 98.2 F | BODY MASS INDEX: 32.95 KG/M2 | SYSTOLIC BLOOD PRESSURE: 113 MMHG | WEIGHT: 186 LBS

## 2024-03-14 DIAGNOSIS — N95.1 SYMPTOMATIC MENOPAUSAL OR FEMALE CLIMACTERIC STATES: Primary | ICD-10-CM

## 2024-03-14 DIAGNOSIS — D49.59: ICD-10-CM

## 2024-03-14 DIAGNOSIS — Z80.0 FAMILY HISTORY OF COLON CANCER: ICD-10-CM

## 2024-03-14 PROCEDURE — 99213 OFFICE O/P EST LOW 20 MIN: CPT | Performed by: OBSTETRICS & GYNECOLOGY

## 2024-03-14 RX ORDER — ESTRADIOL 0.03 MG/D
1 FILM, EXTENDED RELEASE TRANSDERMAL
Qty: 8 PATCH | Refills: 3 | Status: SHIPPED | OUTPATIENT
Start: 2024-03-14

## 2024-03-14 ASSESSMENT — PAIN SCALES - GENERAL: PAINLEVEL: MILD PAIN (2)

## 2024-03-14 NOTE — PATIENT INSTRUCTIONS
It was a pleasure seeing you in clinic today-please reach out if there are any further questions that arise following today's visit.  During business hours, you may reach me at [phone].  For urgent/emergent questions after business hours, you may reach the on-call Gynecologic Oncology Resident through the [facility]  at [phone].    All normal test results are usually communicated via letter or [employer] message.  Abnormal results (those that require a change in the previously discussed plan of care) are usually communicated via a phone call.    I recommend signing up for Identified access if you have not already done so.  This allows you online access to your lab results and also helps you communicate efficiently with your clinic should any questions arise in your care.    Follow up appointment every 6 months for pelvic exams and ca125 with regular gynecologist  Return as needed for concerns or issues  Start estrogen patch as directed, reach out to primary gynecologist for any issues regarding patch   Dr Milla Cruz RN  Phone:  [phone]  Fax:  [phone]

## 2024-03-14 NOTE — NURSING NOTE
Will follow-up every 6 months with regular gynecology for pelvic exams and ca125  Start vivelle patch as directed

## 2024-03-14 NOTE — LETTER
3/14/2024       RE: Jonelle Hart  [address]  [facility] [address]     Dear Colleague,    Thank you for referring your patient, Jonelle Hart, to the [facility] CANCER CLINIC at [facility]. Please see a copy of my visit note below.                            Consult Notes on Referred Patient    Date: 3/14/2024       Dr. Monique Miller MD  No address on file       RE: Jonelle Hart  : 1975  MIKAYLA: 3/14/2024    Dear Dr. Referred Self:    I had the pleasure of seeing your patient Jonelle Hart here at the Gynecologic Cancer Clinic at the [facility] on 3/14/2024.  As you know she is a very pleasant 48 year old woman with a recent diagnosis of  STIC arising Right FT fimbria after a TLH, BS. She is now s/p Lsc BSO, omental biopsy, infracolic omentectomy, peritoneal biopsies, and pelvic washings for completion of surgical staging for STIC lesion.    Today: Patient is doing well. Pain is overall well controlled, occasionally has increased pain on left laparoscopic incision. Ambulating well. Voiding spontaneously and having normal bowel movements. Tolerating PO w/out nausea/vomiting. Denies chest pain, SOB.    Cancer History  24:   A) UTERUS WITH CERVIX AND FALLOPIAN TUBES, TOTAL HYSTERECTOMY WITH BILATERAL SALPINGECTOMY:   1. 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 Left fallopian tube: Benign paratubal cysts, otherwise unremarkable   3. Cervix: Benign endocervical polyp   4. Endometrium: Benign endometrial polyp, background proliferative endometrium   5. Myometrium: Leiomyomas and adenomyosis   6. Uterine serosa: Endometriosis   7. 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)  - Left fallopian tube with no significant histologic abnormality        Ca 125=11    3/6/24: Procedure: Laparoscopic omental biopsy, peritoneal washings, diaphragm washings, peritoneal biopsies, bilateral oophorectomy, infracolic omentectomy        Interpretation:                  Negative for malignancy                 Adequacy:                 Satisfactory for evaluation but limited by:, Scant cellularity        Specimen B  Diaphragm, Left, Left diaphragm washings, Diaphragm Fld:                 Interpretation:                  Negative for malignancy                 Adequacy:                 Satisfactory for evaluation but limited by:, Scant cellularity        Specimen C  Pelvis, Pelvic washings, Pelvic Wash:                 Interpretation:                  Negative for malignancy    A. Pelvis, biopsy:  - Non-caseating granulomas with calcification  - Negative for malignancy     B. Right ovary, oophorectomy:  - Follicular cysts  - Negative for malignancy     C.  Left ovary, oophorectomy:  - Follicular cysts  - Negative for malignancy     D. Right pelvic peritoneal biopsy:  - Histologically unremarkable fibro-adipose tissue     E. Right Paracolic Gutter, biopsy:  - Histologically unremarkable fibro-adipose tissue     F. Left pelvic side wall, biopsy:  - Histologically unremarkable fibro-adipose tissue     G. Left, Paracolic Gutter, biopsy:  - Unremarkable fibroadipose tissue     H. Omentum, Omentectomy:  - Omental adipose tissue with focal fat necrosis  - Negative for malignancy      Review of Systems:    I have reviewed the pertinent positive findings in the review of symptoms with the patient.        Past Medical History:    No heart dz. Lung or DM      Past Surgical History:  Laoparoscopic DEQUAN kinney   [address] [place] [address]  Maintenance:  Health Maintenance Due   Topic Date Due     ADVANCE CARE PLANNING  Never done     GLUCOSE  Never done     Pneumococcal Vaccine: Pediatrics (0 to 5 Years) and At-Risk Patients (6 to 64 Years) (1 of 2 - PCV) Never done     COLORECTAL CANCER SCREENING  Never done     HIV SCREENING  Never done     HEPATITIS C SCREENING  Never done     HEPATITIS B IMMUNIZATION (1 of 3 - 19+ 3-dose series) Never done     DTAP/TDAP/TD IMMUNIZATION (1 - Tdap) Never done     LIPID  Never done     YEARLY PREVENTIVE VISIT  2023     PHQ-2 (once per calendar year)  Never done       Last Pap Smear: 2023              Result: normal  She has not had a history of abnormal Pap smears.    Last Mammogram: 23             Result: normal      She has not had a history of abnormal mammograms.    Last Colonoscopy: 1 year ago, 2 polyps removed              Result: abnormal: polyps, next due in 5 years      Current Medications:     has a current medication list which includes the following prescription(s): acetaminophen, acetaminophen, hydromorphone, ibuprofen, ibuprofen, ondansetron, and senna-docusate.       Allergies:     erythromycin       Social History:     Social History     Tobacco Use     Smoking status: Never     Smokeless tobacco: Never   Substance Use Topics     Alcohol use: Never       History   Drug Use Unknown     No tobacco x 10 years      Family History:     The patient's family history is notable for the following.    Paternal GF  colon cancer in his 50's and paternal cousin age 53, abnormal polyps, no uterine cancer or ovarian cancer  No breast cancer.       Physical Exam:  B/P: 113/73, T: 98.2, P: 86, R: 15     Resp 15   Wt 84.4 kg (186 lb)   BMI 32.95 kg/m    Body mass index is 32.95 kg/m .    General Appearance: healthy and alert, no distress    Musculoskeletal: extremities non tender and without edema    Neurological: normal gait, no gross defects     Psychiatric: appropriate mood and affect              Cardiovascular: Well perfused, regular rate    Pulmonary: Breathing comfortably on room air.     Gastrointestinal:       abdomen soft, non-tender, non-distended, no organomegaly or masses. Incisions are well-approximated and healing well. No erythema or drainage.      Assessment:    Jonelle Hart is a 48 year old woman with a diagnosis of STIC lesion on right fallopian tube.    Plan:     1.)    STIC lesion of right fimbria: S/p staging surgery and BSO. Biopsies and washings all negative for malignancy.  - Recommend q 6 months visits with pelvic exam and CA-125 at least for 2 years, and then can possibly switch to annual visits. Can follow-up with primary Ob/Gyn. She understands there is no good screening for primary peritoneal cancer but there is <5% risk for patients with STIC lesions to develop peritoneal carcinomatosis.      2.) Genetic risk factors were assessed: s/p visit with genetic counseler, BRCA testing negative.    3.) Labs and/or tests ordered include:  none today.     4.) Health maintenance issues addressed today include UTD.    5.) Surgical menopause, hot flashes: discussed options for management and plan to start with vivelle-dot 0.025 biweekly patch. Patient does not have history of blood clots, HTN, or smoking. Can continue to manage this with primary Ob/Gyn.    Thank you for allowing us to participate in the care of your patient.       Patient was discussed with Dr. Loyola.    Sindy Huffman MD  Gynecologic Oncology, PGY-4  03/14/2024 9:24 AM    I saw and evaluated the patient with the resident.  I edited and reviewed the above note. I have reviewed all pertinent imaging and labs on this patient.    Mimi Loyola MD  Professor  Department of Ob/Gyn and Women's Health  Division of Gynecologic Oncology  [facility]  [phone]    Of a 20 minute appointment, more than 50% was spent in counseling the patient.    CC  Patient Care Team:  Dorita Elliott MD as PCP -  Peg Olivo GC as Genetic Counselor (Genetic Counselor, MS)  Dominga Cotter RN as Specialty Care Coordinator (Hematology & Oncology)  Mimi Loyola MD as Assigned Cancer Care Provider  SELF, REFERRED        Again, thank you for allowing me to participate in the care of your patient.      Sincerely,    Mimi Loyola MD

## 2024-03-14 NOTE — PROGRESS NOTES
Consult Notes on Referred Patient    Date: 3/14/2024       Dr. Monique Miller MD  No address on file       RE: Jonelle Hart  : 1975  MIKAYLA: 3/14/2024    Dear Dr. Referred Self:    I had the pleasure of seeing your patient Jonelle Hart here at the Gynecologic Cancer Clinic at the [facility] on 3/14/2024.  As you know she is a very pleasant 48 year old woman with a recent diagnosis of  STIC arising Right FT fimbria after a TLH, BS. She is now s/p Lsc BSO, omental biopsy, infracolic omentectomy, peritoneal biopsies, and pelvic washings for completion of surgical staging for STIC lesion.    Today: Patient is doing well. Pain is overall well controlled, occasionally has increased pain on left laparoscopic incision. Ambulating well. Voiding spontaneously and having normal bowel movements. Tolerating PO w/out nausea/vomiting. Denies chest pain, SOB.    Cancer History  24:   A) UTERUS WITH CERVIX AND FALLOPIAN TUBES, TOTAL HYSTERECTOMY WITH BILATERAL SALPINGECTOMY:   1. 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 Left fallopian tube: Benign paratubal cysts, otherwise unremarkable   3. Cervix: Benign endocervical polyp   4. Endometrium: Benign endometrial polyp, background proliferative endometrium   5. Myometrium: Leiomyomas and adenomyosis   6. Uterine serosa: Endometriosis   7. Uterine weight: 237 grams    Final Diagnosis   CASE FROM [place], [place] ([id number], OBTAINED 24):  Uterus and fallopian tubes, hysterectomy with bilateral salpingectomy:  - Benign endometrial polyp  - Leiomyomas  - Adenomyosis  - Uterine serosal endometriosis  - Cervix with Nabothian cysts and benign endocervical polyp  - Right fallopian tube serous tubal intraepithelial carcinoma (STIC)  - Left fallopian tube with no significant histologic abnormality        Ca 125=11    3/6/24: Procedure: Laparoscopic omental  biopsy, peritoneal washings, diaphragm washings, peritoneal biopsies, bilateral oophorectomy, infracolic omentectomy        Interpretation:                  Negative for malignancy                 Adequacy:                 Satisfactory for evaluation but limited by:, Scant cellularity        Specimen B  Diaphragm, Left, Left diaphragm washings, Diaphragm Fld:                 Interpretation:                  Negative for malignancy                 Adequacy:                 Satisfactory for evaluation but limited by:, Scant cellularity        Specimen C  Pelvis, Pelvic washings, Pelvic Wash:                 Interpretation:                  Negative for malignancy    A. Pelvis, biopsy:  - Non-caseating granulomas with calcification  - Negative for malignancy     B. Right ovary, oophorectomy:  - Follicular cysts  - Negative for malignancy     C.  Left ovary, oophorectomy:  - Follicular cysts  - Negative for malignancy     D. Right pelvic peritoneal biopsy:  - Histologically unremarkable fibro-adipose tissue     E. Right Paracolic Gutter, biopsy:  - Histologically unremarkable fibro-adipose tissue     F. Left pelvic side wall, biopsy:  - Histologically unremarkable fibro-adipose tissue     G. Left, Paracolic Gutter, biopsy:  - Unremarkable fibroadipose tissue     H. Omentum, Omentectomy:  - Omental adipose tissue with focal fat necrosis  - Negative for malignancy      Review of Systems:    I have reviewed the pertinent positive findings in the review of symptoms with the patient.        Past Medical History:    No heart dz. Lung or DM      Past Surgical History:  DEQUAN Mann   1997      Health Maintenance:  Health Maintenance Due   Topic Date Due     ADVANCE CARE PLANNING  Never done     GLUCOSE  Never done     Pneumococcal Vaccine: Pediatrics (0 to 5 Years) and At-Risk Patients (6 to 64 Years) (1 of 2 - PCV) Never done     COLORECTAL CANCER SCREENING  Never done     HIV SCREENING  Never done      HEPATITIS C SCREENING  Never done     HEPATITIS B IMMUNIZATION (1 of 3 - 19+ 3-dose series) Never done     DTAP/TDAP/TD IMMUNIZATION (1 - Tdap) Never done     LIPID  Never done     YEARLY PREVENTIVE VISIT  2023     PHQ-2 (once per calendar year)  Never done       Last Pap Smear: 2023              Result: normal  She has not had a history of abnormal Pap smears.    Last Mammogram: 23             Result: normal      She has not had a history of abnormal mammograms.    Last Colonoscopy: 1 year ago, 2 polyps removed              Result: abnormal: polyps, next due in 5 years      Current Medications:     has a current medication list which includes the following prescription(s): acetaminophen, acetaminophen, hydromorphone, ibuprofen, ibuprofen, ondansetron, and senna-docusate.       Allergies:     erythromycin       Social History:     Social History     Tobacco Use     Smoking status: Never     Smokeless tobacco: Never   Substance Use Topics     Alcohol use: Never       History   Drug Use Unknown     No tobacco x 10 years      Family History:     The patient's family history is notable for the following.    Paternal GF  colon cancer in his 50's and paternal cousin age 53, abnormal polyps, no uterine cancer or ovarian cancer  No breast cancer.       Physical Exam:  B/P: 113/73, T: 98.2, P: 86, R: 15     Resp 15   Wt 84.4 kg (186 lb)   BMI 32.95 kg/m    Body mass index is 32.95 kg/m .    General Appearance: healthy and alert, no distress    Musculoskeletal: extremities non tender and without edema    Neurological: normal gait, no gross defects     Psychiatric: appropriate mood and affect             Cardiovascular: Well perfused, regular rate    Pulmonary: Breathing comfortably on room air.     Gastrointestinal:       abdomen soft, non-tender, non-distended, no organomegaly or masses. Incisions are well-approximated and healing well. No erythema or drainage.      Assessment:    Jonelle Hart is a 48  year old woman with a diagnosis of STIC lesion on right fallopian tube.    Plan:     1.)    STIC lesion of right fimbria: S/p staging surgery and BSO. Biopsies and washings all negative for malignancy.  - Recommend q 6 months visits with pelvic exam and CA-125 at least for 2 years, and then can possibly switch to annual visits. Can follow-up with primary Ob/Gyn. She understands there is no good screening for primary peritoneal cancer but there is <5% risk for patients with STIC lesions to develop peritoneal carcinomatosis.      2.) Genetic risk factors were assessed: s/p visit with genetic counseler, BRCA testing negative.    3.) Labs and/or tests ordered include:  none today.     4.) Health maintenance issues addressed today include UTD.    5.) Surgical menopause, hot flashes: discussed options for management and plan to start with vivelle-dot 0.025 biweekly patch. Patient does not have history of blood clots, HTN, or smoking. Can continue to manage this with primary Ob/Gyn.    Thank you for allowing us to participate in the care of your patient.       Patient was discussed with Dr. Loyola.    Sindy Huffman MD  Gynecologic Oncology, PGY-4  03/14/2024 9:24 AM    I saw and evaluated the patient with the resident.  I edited and reviewed the above note. I have reviewed all pertinent imaging and labs on this patient.    Mimi Loyola MD  Professor  Department of Ob/Gyn and Women's Health  Division of Gynecologic Oncology  [facility]  [phone]    Of a 20 minute appointment, more than 50% was spent in counseling the patient.    CC  Patient Care Team:  Dorita Elliott MD as PCP - General  Peg Skinner GC as Genetic Counselor (Genetic Counselor, MS)  Dominga Cotter RN as Specialty Care Coordinator (Hematology & Oncology)  Mimi Loyola MD as Assigned Cancer Care Provider  SELF, REFERRED

## 2024-03-14 NOTE — LETTER
3/14/2024         RE: Jonelle Hart  [address]  [facility] [address]        Dear Colleague,    Thank you for referring your patient, Jonelle Hart, to the [facility] CANCER CLINIC. Please see a copy of my visit note below.                            Consult Notes on Referred Patient    Date: 3/14/2024       Dr. Monique Miller MD  No address on file       RE: Jonelle Hart  : 1975  MIKAYLA: 3/14/2024    Dear Dr. Referred Self:    I had the pleasure of seeing your patient Jonelle Hart here at the Gynecologic Cancer Clinic at the [facility] on 3/14/2024.  As you know she is a very pleasant 48 year old woman with a recent diagnosis of  STIC arising Right FT fimbria after a TLH, BS. She is now s/p Lsc BSO, omental biopsy, infracolic omentectomy, peritoneal biopsies, and pelvic washings for completion of surgical staging for STIC lesion.    Today: Patient is doing well. Pain is overall well controlled, occasionally has increased pain on left laparoscopic incision. Ambulating well. Voiding spontaneously and having normal bowel movements. Tolerating PO w/out nausea/vomiting. Denies chest pain, SOB.    Cancer History  24:   A) UTERUS WITH CERVIX AND FALLOPIAN TUBES, TOTAL HYSTERECTOMY WITH BILATERAL SALPINGECTOMY:   1. 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 Left fallopian tube: Benign paratubal cysts, otherwise unremarkable   3. Cervix: Benign endocervical polyp   4. Endometrium: Benign endometrial polyp, background proliferative endometrium   5. Myometrium: Leiomyomas and adenomyosis   6. Uterine serosa: Endometriosis   7. Uterine weight: 237 grams    Final Diagnosis   CASE FROM [place], [place] ([id number], OBTAINED 24):  Uterus and fallopian tubes, hysterectomy with bilateral salpingectomy:  - Benign endometrial polyp  - Leiomyomas  - Adenomyosis  - Uterine serosal endometriosis  -  Cervix with Nabothian cysts and benign endocervical polyp  - Right fallopian tube serous tubal intraepithelial carcinoma (STIC)  - Left fallopian tube with no significant histologic abnormality        Ca 125=11    3/6/24: Procedure: Laparoscopic omental biopsy, peritoneal washings, diaphragm washings, peritoneal biopsies, bilateral oophorectomy, infracolic omentectomy        Interpretation:                  Negative for malignancy                 Adequacy:                 Satisfactory for evaluation but limited by:, Scant cellularity        Specimen B  Diaphragm, Left, Left diaphragm washings, Diaphragm Fld:                 Interpretation:                  Negative for malignancy                 Adequacy:                 Satisfactory for evaluation but limited by:, Scant cellularity        Specimen C  Pelvis, Pelvic washings, Pelvic Wash:                 Interpretation:                  Negative for malignancy    A. Pelvis, biopsy:  - Non-caseating granulomas with calcification  - Negative for malignancy     B. Right ovary, oophorectomy:  - Follicular cysts  - Negative for malignancy     C.  Left ovary, oophorectomy:  - Follicular cysts  - Negative for malignancy     D. Right pelvic peritoneal biopsy:  - Histologically unremarkable fibro-adipose tissue     E. Right Paracolic Gutter, biopsy:  - Histologically unremarkable fibro-adipose tissue     F. Left pelvic side wall, biopsy:  - Histologically unremarkable fibro-adipose tissue     G. Left, Paracolic Gutter, biopsy:  - Unremarkable fibroadipose tissue     H. Omentum, Omentectomy:  - Omental adipose tissue with focal fat necrosis  - Negative for malignancy      Review of Systems:    I have reviewed the pertinent positive findings in the review of symptoms with the patient.        Past Medical History:    No heart dz. Lung or DM      Past Surgical History:  LaoparDEQUAN pierce         Health Maintenance:  Health Maintenance Due   Topic Date Due      ADVANCE CARE PLANNING  Never done     GLUCOSE  Never done     Pneumococcal Vaccine: Pediatrics (0 to 5 Years) and At-Risk Patients (6 to 64 Years) (1 of 2 - PCV) Never done     COLORECTAL CANCER SCREENING  Never done     HIV SCREENING  Never done     HEPATITIS C SCREENING  Never done     HEPATITIS B IMMUNIZATION (1 of 3 - 19+ 3-dose series) Never done     DTAP/TDAP/TD IMMUNIZATION (1 - Tdap) Never done     LIPID  Never done     YEARLY PREVENTIVE VISIT  2023     PHQ-2 (once per calendar year)  Never done       Last Pap Smear: 2023              Result: normal  She has not had a history of abnormal Pap smears.    Last Mammogram: 23             Result: normal      She has not had a history of abnormal mammograms.    Last Colonoscopy: 1 year ago, 2 polyps removed              Result: abnormal: polyps, next due in 5 years      Current Medications:     has a current medication list which includes the following prescription(s): acetaminophen, acetaminophen, hydromorphone, ibuprofen, ibuprofen, ondansetron, and senna-docusate.       Allergies:     erythromycin       Social History:     Social History     Tobacco Use     Smoking status: Never     Smokeless tobacco: Never   Substance Use Topics     Alcohol use: Never       History   Drug Use Unknown     No tobacco x 10 years      Family History:     The patient's family history is notable for the following.    Paternal GF  colon cancer in his 50's and paternal cousin age 53, abnormal polyps, no uterine cancer or ovarian cancer  No breast cancer.       Physical Exam:  B/P: 113/73, T: 98.2, P: 86, R: 15     Resp 15   Wt 84.4 kg (186 lb)   BMI 32.95 kg/m    Body mass index is 32.95 kg/m .    General Appearance: healthy and alert, no distress    Musculoskeletal: extremities non tender and without edema    Neurological: normal gait, no gross defects     Psychiatric: appropriate mood and affect             Cardiovascular: Well perfused, regular  rate    Pulmonary: Breathing comfortably on room air.     Gastrointestinal:       abdomen soft, non-tender, non-distended, no organomegaly or masses. Incisions are well-approximated and healing well. No erythema or drainage.      Assessment:    Jonelle Hart is a 48 year old woman with a diagnosis of STIC lesion on right fallopian tube.    Plan:     1.)    STIC lesion of right fimbria: S/p staging surgery and BSO. Biopsies and washings all negative for malignancy.  - Recommend q 6 months visits with pelvic exam and CA-125 at least for 2 years, and then can possibly switch to annual visits. Can follow-up with primary Ob/Gyn. She understands there is no good screening for primary peritoneal cancer but there is <5% risk for patients with STIC lesions to develop peritoneal carcinomatosis.      2.) Genetic risk factors were assessed: s/p visit with genetic counseler, BRCA testing negative.    3.) Labs and/or tests ordered include:  none today.     4.) Health maintenance issues addressed today include UTD.    5.) Surgical menopause, hot flashes: discussed options for management and plan to start with vivelle-dot 0.025 biweekly patch. Patient does not have history of blood clots, HTN, or smoking. Can continue to manage this with primary Ob/Gyn.    Thank you for allowing us to participate in the care of your patient.       Patient was discussed with Dr. Loyola.    Sindy Huffman MD  Gynecologic Oncology, PGY-4  03/14/2024 9:24 AM    I saw and evaluated the patient with the resident.  I edited and reviewed the above note. I have reviewed all pertinent imaging and labs on this patient.    Mimi Loyola MD  Professor  Department of Ob/Gyn and Women's Health  Division of Gynecologic Oncology  [facility]  [phone]    Of a 20 minute appointment, more than 50% was spent in counseling the patient.    CC  Patient Care Team:  Dorita Elliott MD as PCP - ePg Olivo GC as  Genetic Counselor (Genetic Counselor, MS)  Dominga Cotter, RN as Specialty Care Coordinator (Hematology & Oncology)  Mimi Loyola MD as Assigned Cancer Care Provider  SELF, REFERRED

## 2024-03-26 ENCOUNTER — VIRTUAL VISIT (OUTPATIENT)
Dept: ONCOLOGY | Facility: CLINIC | Age: 49
End: 2024-03-26
Attending: GENETIC COUNSELOR, MS
Payer: COMMERCIAL

## 2024-03-26 DIAGNOSIS — D49.59: ICD-10-CM

## 2024-03-26 DIAGNOSIS — Z80.0 FAMILY HISTORY OF COLON CANCER: ICD-10-CM

## 2024-03-26 PROCEDURE — 999N000069 HC STATISTIC GENETIC COUNSELING, < 16 MIN: Mod: GT,95 | Performed by: GENETIC COUNSELOR, MS

## 2024-03-26 NOTE — PROGRESS NOTES
"3/26/2024    Virtual Visit Details  Type of service:  Video Visit   Originating Location (pt. Location): Home  Distant Location (provider location):  Off-site  Platform used for Video Visit: YohannesWell  Length of video visit: 14 minutes    Referring Provider: Mimi Loyola MD    Presenting Information:  I spoke to Jonelle by video today to discuss her genetic testing results. We last met on 2/19/2024 and her blood was drawn on 2/20/2024. The [facility] Common Hereditary Cancers Panel was ordered from [facility]. This testing was done because of Jonelle's personal history of a STIC lesion and family history of cancer.      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 This variant is called c.3807A>G (also known as [id number]). No other variants or mutations were found in the APC gene. Given the uncertain significance of this result, medical management decisions should NOT be made based on this test result alone.    Of note, Jonelle tested negative for variants and mutations in the following genes by sequencing and deletion/duplication analysis (unless otherwise specified in the test report): JOSE, AXIN2, BAP1, BARD1, BMPR1A, BRCA1, BRCA2, BRIP1, CDH1, CDK4, CDKN2A, CHEK2, CTNNA1, DICER1, EPCAM, FH, GREM1, HOXB13, KIT, MBD4, MEN1, MLH1, MSH2, MSH3, MSH6, MUTYH, NF1, NTHL1, PALB2, PDGFRA, PMS2, POLD1, POLE, PTEN, RAD51C, RAD51D, SDHA, SDHB, SDHC, SDHD, SMAD4, SMARCA4, STK11, TP53, TSC1, TSC2, VHL.   We reviewed the autosomal dominant inheritance of these genes.   Jonelle cannot pass on a mutation in any of these genes to her daughter based on this test result. Mutations in these genes do not skip generations.      A copy of the test report can be found in the Laboratory tab, dated 2/20/2024, and named \"LABORATORY MISCELLANEOUS ORDER\". The report is scanned in as a linked document.    Interpretation:  We discussed several different " interpretations of this inconclusive test result. It is not clear if this variant in the APC gene is associated with increased cancer risk.  1. This variant may be a benign change that does not increase cancer risk.  2. This variant may be a harmful mutation that causes Familial Adenomatous Polyposis and/or other APC-related conditions.    Genetic testing labs are working to collect evidence to determine if this variant is harmful or benign, and Ra[facility] will contact me if it is reclassified. If this variant is determined to be a benign change, there may be a different gene or combination of genes and environment that are associated with the cancers in this family that are not identifiable using this genetic test. As such, Jonelle is encouraged to contact me regularly to review any new genetic testing options that may be appropriate for her.    It is also important to consider that her relatives with cancer, such as her paternal first cousin with early-onset colon cancer, may have had a mutation in one of the genes tested and Jonelle did not inherit it. If that is the case, Jonelle's STIC lesion may be sporadic and caused by random cellular changes.    Inheritance:  We reviewed the autosomal dominant inheritance of this variant in the APC gene. We discussed that Jonelle has a 50% chance to pass this variant to her daughter. Other relatives, such as her parents and/or maternal half brother, may carry the variant as well. Because it is unclear what, if any, risk is associated with this variant, clinical genetic testing for this APC variant alone is not recommended for relatives.    Cancer Screening:  Based on this inconclusive test result, it is important for Jonelle and her relatives to refer back to the family history for appropriate cancer screening.    Jonelle should continue to follow all gynecologic cancer screening/follow-up recommendations as made by Dr. Loyola and her other medical providers. Of note, Jonelle recently had  both of her ovaries removed and the pathology was benign.  Based on Jonelle's history of a STIC lesion, we discussed that it is uncertain if her close female relatives are at any increased risk for similar lesions. We discussed that screening for related cancers, such as ovarian cancer, has significant limitations and is not typically recommended. That being said, Jonelle's female relatives would be encouraged to share the family history, and any concerning signs/symptoms, with their primary OB/GYN provider. Their primary OB/GYN provider may have individualized recommendations.  Other population cancer screening options, such as those recommended by the American Cancer Society and the National Comprehensive Cancer Network (NCCN), are also appropriate for Jonelle and her family. These screening recommendations may change if there are changes to Jonelle's personal and/or family history of cancer. Final screening recommendations should be made by each individual's primary care provider.    Additional Testing Considerations:  Although Jonelle's genetic testing result is inconclusive, other relatives may still carry a harmful gene mutation associated with hereditary cancer. Genetic counseling is recommended for the close relatives (children, siblings) of her paternal first cousin with early-onset colon cancer to discuss their genetic testing options. Other paternal relatives could consider meeting with a genetic counselor, as well. If any of these relatives do pursue genetic testing, Jonelle is encouraged to contact me so that we may review the impact of their test results on her.    Summary:  We do not have an explanation for Jonelle's history of a STIC lesion or family history of cancer. While no obviously harmful genetic changes were identified, Jonelle may still be at risk for certain cancers due to family history, environmental factors, or other genetic causes not identified by this test. Because of that, it is important that she  continue with cancer screening based on her personal and family history as discussed above.    Genetic testing is rapidly advancing, and new cancer susceptibility genes will most likely be identified in the future. Therefore, I encouraged Jonelle to contact me periodically or if there are changes in her personal or family history. This may change how we assess her cancer risk, screening, and the testing we would offer.    Plan:  1. At her request, I provided Jonelle with a copy of her test results via [facility] patient portal.    2. She plans to follow-up with her medical providers, as needed.  3. She should contact me regularly, or sooner if her family history changes.  4. I will attempt to contact Jonelle if the laboratory informs me that this APC variant has been reclassified. This may change screening and testing recommendations for Jonelle and her relatives.    If Jonelle has any further questions, I encouraged her to contact me at [phone].    Peg Skinner MS, [facility] [place] [facility]  Licensed, Certified Genetic Counselor  Office: [phone]  [email or web address]

## 2024-03-26 NOTE — NURSING NOTE
Is the patient currently in the state of [place]? YES    Visit mode:VIDEO    If the visit is dropped, the patient can be reconnected by: VIDEO VISIT: Text to cell phone:   Telephone Information:   Mobile [phone]       Will anyone else be joining the visit? NO  (If patient encounters technical issues they should call [phone])    How would you like to obtain your AVS? MyChart    Are changes needed to the allergy or medication list? N/A    Reason for visit: ANTHONY LAGUNA

## 2024-03-26 NOTE — LETTER
3/26/2024         RE: Jonelle Hart  [address]  [place] [facility] [address]        Dear Colleague,    Thank you for referring your patient, Jonelle Hart, to the [facility]. Please see a copy of my visit note below.    3/26/2024    Virtual Visit Details  Type of service:  Video Visit   Originating Location (pt. Location): Home  Distant Location (provider location):  Off-site  Platform used for Video Visit: Well  Length of video visit: 14 minutes    Referring Provider: Mimi Loyola MD    Presenting Information:  I spoke to Jonelle by video today to discuss her genetic testing results. We last met on 2/19/2024 and her blood was drawn on 2/20/2024. The [facility] Common Hereditary Cancers Panel was ordered from [facility]. This testing was done because of Jonelle's personal history of a STIC lesion and family history of cancer.      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 This variant is called c.3807A>G (also known as p.[id number]). No other variants or mutations were found in the APC gene. Given the uncertain significance of this result, medical management decisions should NOT be made based on this test result alone.    Of note, Jonelle tested negative for variants and mutations in the following genes by sequencing and deletion/duplication analysis (unless otherwise specified in the test report): JOSE, AXIN2, BAP1, BARD1, BMPR1A, BRCA1, BRCA2, BRIP1, CDH1, CDK4, CDKN2A, CHEK2, CTNNA1, DICER1, EPCAM, FH, GREM1, HOXB13, KIT, MBD4, MEN1, MLH1, MSH2, MSH3, MSH6, MUTYH, NF1, NTHL1, PALB2, PDGFRA, PMS2, POLD1, POLE, PTEN, RAD51C, RAD51D, SDHA, SDHB, SDHC, SDHD, SMAD4, SMARCA4, STK11, TP53, TSC1, TSC2, VHL.   We reviewed the autosomal dominant inheritance of these genes.   Jonelle cannot pass on a mutation in any of these genes to her daughter based on this test result. Mutations in these  "genes do not skip generations.      A copy of the test report can be found in the Laboratory tab, dated 2/20/2024, and named \"LABORATORY MISCELLANEOUS ORDER\". The report is scanned in as a linked document.    Interpretation:  We discussed several different interpretations of this inconclusive test result. It is not clear if this variant in the APC gene is associated with increased cancer risk.  1. This variant may be a benign change that does not increase cancer risk.  2. This variant may be a harmful mutation that causes Familial Adenomatous Polyposis and/or other APC-related conditions.    Genetic testing labs are working to collect evidence to determine if this variant is harmful or benign, and [facility] will contact me if it is reclassified. If this variant is determined to be a benign change, there may be a different gene or combination of genes and environment that are associated with the cancers in this family that are not identifiable using this genetic test. As such, Jonelle is encouraged to contact me regularly to review any new genetic testing options that may be appropriate for her.    It is also important to consider that her relatives with cancer, such as her paternal first cousin with early-onset colon cancer, may have had a mutation in one of the genes tested and Jonelle did not inherit it. If that is the case, Jonelle's STIC lesion may be sporadic and caused by random cellular changes.    Inheritance:  We reviewed the autosomal dominant inheritance of this variant in the APC gene. We discussed that Jonelle has a 50% chance to pass this variant to her daughter. Other relatives, such as her parents and/or maternal half brother, may carry the variant as well. Because it is unclear what, if any, risk is associated with this variant, clinical genetic testing for this APC variant alone is not recommended for relatives.    Cancer Screening:  Based on this inconclusive test result, it is important for Jonelle and her " relatives to refer back to the family history for appropriate cancer screening.    Jonelle should continue to follow all gynecologic cancer screening/follow-up recommendations as made by Dr. Loyola and her other medical providers. Of note, Jonelle recently had both of her ovaries removed and the pathology was benign.  Based on Jonelle's history of a STIC lesion, we discussed that it is uncertain if her close female relatives are at any increased risk for similar lesions. We discussed that screening for related cancers, such as ovarian cancer, has significant limitations and is not typically recommended. That being said, Jonelle's female relatives would be encouraged to share the family history, and any concerning signs/symptoms, with their primary OB/GYN provider. Their primary OB/GYN provider may have individualized recommendations.  Other population cancer screening options, such as those recommended by the American Cancer Society and the National Comprehensive Cancer Network (NCCN), are also appropriate for Jonelle and her family. These screening recommendations may change if there are changes to Jonelle's personal and/or family history of cancer. Final screening recommendations should be made by each individual's primary care provider.    Additional Testing Considerations:  Although Jonelle's genetic testing result is inconclusive, other relatives may still carry a harmful gene mutation associated with hereditary cancer. Genetic counseling is recommended for the close relatives (children, siblings) of her paternal first cousin with early-onset colon cancer to discuss their genetic testing options. Other paternal relatives could consider meeting with a genetic counselor, as well. If any of these relatives do pursue genetic testing, Jonelle is encouraged to contact me so that we may review the impact of their test results on her.    Summary:  We do not have an explanation for Jonelle's history of a STIC lesion or family history of  cancer. While no obviously harmful genetic changes were identified, Jonelle may still be at risk for certain cancers due to family history, environmental factors, or other genetic causes not identified by this test. Because of that, it is important that she continue with cancer screening based on her personal and family history as discussed above.    Genetic testing is rapidly advancing, and new cancer susceptibility genes will most likely be identified in the future. Therefore, I encouraged Jonelle to contact me periodically or if there are changes in her personal or family history. This may change how we assess her cancer risk, screening, and the testing we would offer.    Plan:  1. At her request, I provided Jonelle with a copy of her test results via [facility] patient portal.    2. She plans to follow-up with her medical providers, as needed.  3. She should contact me regularly, or sooner if her family history changes.  4. I will attempt to contact Jonelle if the laboratory informs me that this APC variant has been reclassified. This may change screening and testing recommendations for Jonelle and her relatives.    If Jonelle has any further questions, I encouraged her to contact me at [phone].    Peg Skinner MS, [facility] [place]  Licensed, Certified Genetic Counselor  Office: [phone]  [email or web address]

## 2024-03-27 NOTE — PATIENT INSTRUCTIONS
Genetic Testing  Genetic testing involved a blood or saliva test which looked at the genetic information in select genes for variants associated with cancer risk.  This testing may have included analysis of a single gene due to a known variant in the family, multiple genes most associated with the cancers in a family, or an expanded panel of genes related to many types of cancers.    Results  There are several possible genetic test results, including:   Positive--a harmful mutation (also known as a  pathogenic  or  likely pathogenic  variant) was identified in a gene associated with increased cancer risk.  These risks, as well as medical management options, depend on the specific genetic variant identified.    Negative--no variants were identified in the genes analyzed   Variant of unknown significance--a variant was identified in one or more genes, though it is currently unclear how this impacts cancer risk in the family.  Genetic testing labs are working to collect evidence about these uncertain variants and may provide updates in the future.    What is a Variant of Unknown Significance?  A variant of unknown significance (VUS) is a genetic change with unclear clinical significance.  Scientists currently do not know if this specific variant is associated with increased cancer risks,  or if it is a benign (harmless) change with no impact on health.       A variant may be of uncertain significance for several reasons.  It is possible that this specific variant has not been seen before by the laboratory, or only in a small number of families.  There is currently not enough information to know how this variant may impact your health.          Reclassification  Genetic testing laboratories and researchers are collecting evidence to determine the importance of variants of unknown significance.  Many variants of uncertain significance are later reclassified as benign findings, however some may be associated with  increased cancer risk.  Laboratories will often notify the genetic counselor/ordering provider when a patient s VUS has been reclassified.        Some families may be candidates for participation in the laboratory s variant research programs to help determine the importance of their VUS.  Participating in these programs does not guarantee that families will learn the significance of their VUS immediately.  It could be months or years before a VUS is reclassified.       Screening Recommendations  A combination of personal and family history factors may inform cancer risk and medical management recommendations.  Population cancer screening options, such as those recommended by the American Cancer Society and the National Comprehensive Cancer Network (NCCN) are appropriate for many families at average risk for cancer.  However, earlier and/or more frequent screening may be recommended based on personal factors (lifestyle, exposures, medications, screening results), family history of cancer, and sometimes genetic factors.  These cancer risk management options should be discussed in more detail with an individual's medical providers.      It is usually not recommended that other relatives have genetic testing for the VUS unless it is done as part of the laboratory s variant research program because an individual s test results should not influence their cancer screening until we determine the importance of the VUS.  Your genetic counselor can help you and your relatives understand the risks and benefits of all genetic testing and cancer screening options.    Please call us if you have any questions or concerns.   Cancer Risk Management Program (Appointments: [phone])  Mic Simpson, MS [facility] [place]  [phone]  Melody Thompson, MS, [facility] [phone]  Lilia Rosado, MS, [facility]  [phone]  Betsy Jacobson, MS, [facility] [place]  [phone]  Jen Penaloza, MS, [facility]  [phone]  Peg Skinner, MS, [facility] [phone]  Grace Esqueda MS,  [facility] [place] [phone]

## 2024-03-29 NOTE — PROGRESS NOTES
Admission Date: 3/6/24  Post op 3/14/24      Patient reports symptoms are: stable  Does patient have all of their medications? Yes  Does patient know what their medications are for? Yes  Does patient have follow-up appointment(s) scheduled?  Yes  Does patient have any other questions or concerns?  No

## 2025-03-16 ENCOUNTER — HEALTH MAINTENANCE LETTER (OUTPATIENT)
Age: 50
End: 2025-03-16

## 2025-08-10 ENCOUNTER — HEALTH MAINTENANCE LETTER (OUTPATIENT)
Age: 50
End: 2025-08-10

## (undated) DEVICE — SOL WATER IRRIG 1000ML BOTTLE 2F7114

## (undated) DEVICE — DRSG PRIMAPORE 02X3" 7133

## (undated) DEVICE — DRAPE LEGGINGS CLEAR 8430

## (undated) DEVICE — PREP CHLORAPREP 26ML TINTED HI-LITE ORANGE 930815

## (undated) DEVICE — SU VICRYL 0 TIE 54" J608H

## (undated) DEVICE — DEVICE SUTURE PASSER 14GA WECK EFX EFXSP2

## (undated) DEVICE — SUCTION IRR STRYKERFLOW II W/TIP 250-070-520

## (undated) DEVICE — TUBING SMOKE EVAC PNEUMOCLEAR HIGH FLOW 0620050250

## (undated) DEVICE — SPECIMEN TRAP MUCOUS 40ML LUKI C30200A

## (undated) DEVICE — ESU GROUND PAD ADULT W/CORD E7507

## (undated) DEVICE — WIPES FOLEY CARE SURESTEP PROVON DFC100

## (undated) DEVICE — SUCTION MANIFOLD NEPTUNE 2 SYS 4 PORT 0702-020-000

## (undated) DEVICE — LINEN TOWEL PACK X30 5481

## (undated) DEVICE — ESU LIGASURE SEALER/DIVIDER RETRACT L-HOOK 37CM LF5637

## (undated) DEVICE — KIT PATIENT POSITIONING PIGAZZI LATEX FREE 40580

## (undated) DEVICE — DRAPE SHEET HALF 40X60" 9358

## (undated) DEVICE — ENDO TROCAR FIRST ENTRY KII FIOS Z-THRD 05X100MM CTF03

## (undated) DEVICE — CATH TRAY FOLEY 16FR W/URINE METER STATLOCK 303316A

## (undated) DEVICE — Device

## (undated) DEVICE — ENDO POUCH UNIV RETRIEVAL SYSTEM INZII 10MM CD001

## (undated) DEVICE — PREP DYNA-HEX 4% CHG SCRUB 4OZ BOTTLE MDS098710

## (undated) DEVICE — SOL NACL 0.9% INJ 1000ML BAG 2B1324X

## (undated) DEVICE — GLOVE BIOGEL PI MICRO SZ 6.5 48565

## (undated) DEVICE — GLOVE BIOGEL PI MICRO INDICATOR UNDERGLOVE SZ 7.0 48970

## (undated) DEVICE — ENDO TROCAR FIRST ENTRY KII FIOS Z-THRD 12X100MM CTF73

## (undated) DEVICE — LINEN TOWEL PACK X6 WHITE 5487

## (undated) DEVICE — PROTECTOR ARM ONE-STEP TRENDELENBURG 40418

## (undated) DEVICE — NDL INSUFFLATION 13GA 120MM C2201

## (undated) DEVICE — SU VICRYL 4-0 PS-2 18" UND J496H

## (undated) DEVICE — ENDO TROCAR SLEEVE KII Z-THREADED 05X100MM CTS02

## (undated) RX ORDER — SCOLOPAMINE TRANSDERMAL SYSTEM 1 MG/1
PATCH, EXTENDED RELEASE TRANSDERMAL
Status: DISPENSED
Start: 2024-03-06

## (undated) RX ORDER — HYDROMORPHONE HYDROCHLORIDE 2 MG/1
TABLET ORAL
Status: DISPENSED
Start: 2024-03-06

## (undated) RX ORDER — DEXAMETHASONE SODIUM PHOSPHATE 4 MG/ML
INJECTION, SOLUTION INTRA-ARTICULAR; INTRALESIONAL; INTRAMUSCULAR; INTRAVENOUS; SOFT TISSUE
Status: DISPENSED
Start: 2024-03-06

## (undated) RX ORDER — ONDANSETRON 2 MG/ML
INJECTION INTRAMUSCULAR; INTRAVENOUS
Status: DISPENSED
Start: 2024-03-06

## (undated) RX ORDER — HYDROMORPHONE HYDROCHLORIDE 1 MG/ML
INJECTION, SOLUTION INTRAMUSCULAR; INTRAVENOUS; SUBCUTANEOUS
Status: DISPENSED
Start: 2024-03-06

## (undated) RX ORDER — ACETAMINOPHEN 325 MG/1
TABLET ORAL
Status: DISPENSED
Start: 2024-03-06

## (undated) RX ORDER — BUPIVACAINE HYDROCHLORIDE 5 MG/ML
INJECTION, SOLUTION EPIDURAL; INTRACAUDAL
Status: DISPENSED
Start: 2024-03-06

## (undated) RX ORDER — FENTANYL CITRATE 50 UG/ML
INJECTION, SOLUTION INTRAMUSCULAR; INTRAVENOUS
Status: DISPENSED
Start: 2024-03-06

## (undated) RX ORDER — PROPOFOL 10 MG/ML
INJECTION, EMULSION INTRAVENOUS
Status: DISPENSED
Start: 2024-03-06

## (undated) RX ORDER — HEPARIN SODIUM 5000 [USP'U]/.5ML
INJECTION, SOLUTION INTRAVENOUS; SUBCUTANEOUS
Status: DISPENSED
Start: 2024-03-06